# Patient Record
Sex: FEMALE | Race: WHITE | Employment: UNEMPLOYED | URBAN - METROPOLITAN AREA
[De-identification: names, ages, dates, MRNs, and addresses within clinical notes are randomized per-mention and may not be internally consistent; named-entity substitution may affect disease eponyms.]

---

## 2020-01-22 ENCOUNTER — OFFICE VISIT (OUTPATIENT)
Dept: FAMILY MEDICINE | Facility: CLINIC | Age: 20
End: 2020-01-22

## 2020-01-22 ENCOUNTER — APPOINTMENT (OUTPATIENT)
Dept: LAB | Facility: CLINIC | Age: 20
End: 2020-01-22

## 2020-01-22 VITALS
HEIGHT: 68 IN | OXYGEN SATURATION: 98 % | SYSTOLIC BLOOD PRESSURE: 125 MMHG | DIASTOLIC BLOOD PRESSURE: 81 MMHG | WEIGHT: 139 LBS | HEART RATE: 82 BPM | BODY MASS INDEX: 21.07 KG/M2

## 2020-01-22 DIAGNOSIS — K59.1 FUNCTIONAL DIARRHEA: ICD-10-CM

## 2020-01-22 DIAGNOSIS — K59.1 FUNCTIONAL DIARRHEA: Primary | ICD-10-CM

## 2020-01-22 LAB
C DIFF TOX B STL QL: NEGATIVE
SPECIMEN SOURCE: NORMAL

## 2020-01-22 RX ORDER — LEVONORGESTREL AND ETHINYL ESTRADIOL 0.15-0.03
1 KIT ORAL DAILY
Status: ON HOLD | COMMUNITY
Start: 2019-11-05 | End: 2020-12-08

## 2020-01-22 RX ORDER — LEVONORGESTREL AND ETHINYL ESTRADIOL 0.15-0.03
1 KIT ORAL DAILY
Status: ON HOLD | COMMUNITY
End: 2020-12-08

## 2020-01-22 ASSESSMENT — ENCOUNTER SYMPTOMS
ABDOMINAL PAIN: 0
CHILLS: 0
FEVER: 0
FATIGUE: 0
DIARRHEA: 1

## 2020-01-22 ASSESSMENT — PAIN SCALES - GENERAL: PAINLEVEL: NO PAIN (0)

## 2020-01-22 ASSESSMENT — MIFFLIN-ST. JEOR: SCORE: 1454

## 2020-01-22 NOTE — PROGRESS NOTES
"       HPI       Nimco Eaton is a 19 year old woman who presents with diarrhea. She is a healthy woman and is not currently taking any medications other than oral birth control. She was in [place] from 01/04-01/18/2020. She started having diarrhea on the 18th. She is vaccinated, she did not receive yellow fever or Typhoid vaccinations prior to travel. Today, her diarrhea is less than it has been. She wants to make sure she does not have a bacterial infection related to traveling out of the country.   Chief Complaint   Patient presents with     Diarrhea     Pt has diarrhea and nausea.      Problem, Medication and Allergy Lists were reviewed and updated if needed.    Patient is an established patient of this clinic.         Review of Systems:   Review of Systems     Constitutional:  Negative for fever, chills and fatigue.   Gastrointestinal:  Positive for diarrhea. Negative for abdominal pain.               Physical Exam:     Vitals:    01/22/20 0906   BP: 125/81   Pulse: 82   SpO2: 98%   Weight: 63 kg (139 lb)   Height: 1.727 m (5' 8\")     Body mass index is 21.13 kg/m .  Vitals were reviewed and were normal.     Physical Exam  Constitutional:       Appearance: Normal appearance.   HENT:      Head: Normocephalic.   Abdominal:      General: Abdomen is flat. There is no distension.      Palpations: Abdomen is soft.      Tenderness: There is no abdominal tenderness.   Musculoskeletal: Normal range of motion.   Skin:     General: Skin is warm and dry.   Neurological:      General: No focal deficit present.      Mental Status: She is alert and oriented to person, place, and time.   Psychiatric:         Mood and Affect: Mood normal.         Behavior: Behavior normal.         Results:     Stool studies pending.    Assessment and Plan     1. Functional diarrhea    There are no discontinued medications.  First, please obtain your stool samples. Next, please drink 70 oz of water daily. Next, please continue your " probiotic twice daily. Next, please call if you have any questions. Options for treatment and follow-up care were reviewed with the patient. Nimco Eaton  engaged in the decision making process and verbalized understanding of the options discussed and agreed with the final plan.  LEWIS Agosto, CNP  Addendum:I called and left a message stating that Nimco's stool test shows Norovirus. I asked her to review the info on CDC.gov and call if she has any questions. LEWIS Agosto, CNP  Addendum 01/23/2020 1028:Nimco asked that I call her back. I called her back and provided education on the Norovirus. She will hydrate and call if she has any questions or concerns. She verbalizes understanding of the plan.   LEWIS Agosto, CNP

## 2020-01-22 NOTE — NURSING NOTE
"19 year old  Chief Complaint   Patient presents with     Diarrhea     Pt has diarrhea and nausea.        Blood pressure 125/81, pulse 82, height 1.727 m (5' 8\"), weight 63 kg (139 lb), SpO2 98 %. Body mass index is 21.13 kg/m .  BP completed using cuff size:      Kyara Clark, A  January 22, 2020 9:10 AM  "

## 2020-01-22 NOTE — PATIENT INSTRUCTIONS
First, please obtain your stool samples. Next, please drink 70 oz of water daily. Next, please continue your probiotic twice daily. Next, please call if you have any questions.   [facility] Medication Refill Request Information:  * Please contact your pharmacy regarding ANY request for medication refills.  ** NP Clinic Prescription Fax = [phone]  * Please allow 3 business days for routine medication refills.  * Please allow 5 business days for controlled substance medication refills.     [facility] Test Result notification information:  *You will be notified with in 7-10 days of your appointment day regarding the results of your test.  If you are on MyChart you will be notified as soon as the provider has reviewed the results and signed off on them.    [facility]: [phone]

## 2020-01-23 ENCOUNTER — TELEPHONE (OUTPATIENT)
Dept: FAMILY MEDICINE | Facility: CLINIC | Age: 20
End: 2020-01-23

## 2020-01-23 LAB
C COLI+JEJUNI+LARI FUSA STL QL NAA+PROBE: NOT DETECTED
C PARVUM AG STL QL IA: NEGATIVE
EC STX1 GENE STL QL NAA+PROBE: NOT DETECTED
EC STX2 GENE STL QL NAA+PROBE: NOT DETECTED
ENTERIC PATHOGEN COMMENT: ABNORMAL
G LAMBLIA AG STL QL IA: NEGATIVE
NOROV GI+II ORF1-ORF2 JNC STL QL NAA+PR: ABNORMAL
O+P STL MICRO: NORMAL
RVA NSP5 STL QL NAA+PROBE: NOT DETECTED
SALMONELLA SP RPOD STL QL NAA+PROBE: NOT DETECTED
SHIGELLA SP+EIEC IPAH STL QL NAA+PROBE: NOT DETECTED
SPECIMEN SOURCE: NORMAL
SPECIMEN SOURCE: NORMAL
V CHOL+PARA RFBL+TRKH+TNAA STL QL NAA+PR: NOT DETECTED
Y ENTERO RECN STL QL NAA+PROBE: NOT DETECTED

## 2020-01-23 NOTE — TELEPHONE ENCOUNTER
[facility] Call Center    Phone Message    May a detailed message be left on voicemail: yes    Reason for Call: Other: PT states she is returning a call to Laeh Méndez regarding her test results.  Please follow up with the PT.      Action Taken: Message routed to:  [facility] ([facility] NP clinic

## 2020-02-03 ENCOUNTER — OFFICE VISIT (OUTPATIENT)
Dept: OPTOMETRY | Facility: CLINIC | Age: 20
End: 2020-02-03

## 2020-02-03 DIAGNOSIS — H52.533 SPASM OF ACCOMMODATION OF BOTH EYES: Primary | ICD-10-CM

## 2020-02-03 ASSESSMENT — CONF VISUAL FIELD
OS_NORMAL: 1
METHOD: COUNTING FINGERS
OD_NORMAL: 1

## 2020-02-03 ASSESSMENT — TONOMETRY
OS_IOP_MMHG: 19
IOP_METHOD: ICARE
OD_IOP_MMHG: 21

## 2020-02-03 ASSESSMENT — REFRACTION
OD_SPHERE: +0.25
OS_SPHERE: +0.25
OS_CYLINDER: SPHERE
OD_CYLINDER: SPHERE

## 2020-02-03 ASSESSMENT — VISUAL ACUITY
OD_SC: 20/20
OS_SC: J1+
METHOD: SNELLEN - LINEAR
OS_SC: 20/20
OD_SC: J1+

## 2020-02-03 ASSESSMENT — CUP TO DISC RATIO
OD_RATIO: 0.25
OS_RATIO: 0.25

## 2020-02-03 ASSESSMENT — EXTERNAL EXAM - RIGHT EYE: OD_EXAM: NORMAL

## 2020-02-03 ASSESSMENT — REFRACTION_MANIFEST
OD_CYLINDER: SPHERE
OD_SPHERE: +0.25
OS_SPHERE: +0.25
OS_CYLINDER: SPHERE

## 2020-02-03 ASSESSMENT — EXTERNAL EXAM - LEFT EYE: OS_EXAM: NORMAL

## 2020-02-03 ASSESSMENT — SLIT LAMP EXAM - LIDS
COMMENTS: NORMAL
COMMENTS: NORMAL

## 2020-11-09 ENCOUNTER — VIRTUAL VISIT (OUTPATIENT)
Dept: DERMATOLOGY | Facility: CLINIC | Age: 20
End: 2020-11-09
Payer: COMMERCIAL

## 2020-11-09 DIAGNOSIS — L70.0 ACNE VULGARIS: Primary | ICD-10-CM

## 2020-11-09 PROCEDURE — 99202 OFFICE O/P NEW SF 15 MIN: CPT | Mod: 95 | Performed by: PHYSICIAN ASSISTANT

## 2020-11-09 RX ORDER — TRETINOIN 0.25 MG/G
CREAM TOPICAL
Qty: 20 G | Refills: 1 | Status: ON HOLD | OUTPATIENT
Start: 2020-11-09 | End: 2020-12-08

## 2020-11-09 ASSESSMENT — PAIN SCALES - GENERAL: PAINLEVEL: NO PAIN (0)

## 2020-11-09 NOTE — PROGRESS NOTES
[facility] Dermatology Record:  Video: ( Invitation sent by:  [employer] waiting room )      Dermatology Problem List:  Acne vulgaris-  Gentle skin cares (Vanicream products)  Tretinoin 0.025% cream at bedtime    Eyelid dermatitis, likely irritant.  Gentle skin cares. Avoid chemical exposures     Encounter Date: Nov 9, 2020    CC:   Chief Complaint   Patient presents with     Acne     Nimco is wanting to discuss acne.      Derm Problem     Nimco would like to discuss a spot that is on her eyelid.        History of Present Illness:  Nimco Eaton is a 20 year old female who presents for acne concerns. I spoke with her over [employer] video chat. She states she starting having an acne breakout 2 weeks ago. It appeared more red at first, now it looks like white heads and clogged pores. It is mainly around her mouth. She has noticed this after starting a higher strength vitamin E moisturizer, from the [employer] product line. She uses a vitamin E oil to wash her face and witch hazel for a toner. She switched to a clean and clear morning burst for a face wash after this started. She has used this in the past and has tolerated it well.     She also wonders about a small rash on her right upper eyelid. She woke up one morning in the last month with a puffy eyelid and some dryness and redness. She applied a cool compress with improvement. She has been applying moisturizer to the area with improvement. It comes and goes. She sometimes forgets to remove her eye make up and has worsening symptoms that morning. She denies any rashes to other locations. No history of eczema.     ROS: Patient is generally feeling well today. Without other skin concerns.     Physical Examination:  General: Well-appearing 20 year old female, appropriately-developed individual.  Skin: Focused examination including the face was performed.   -No large pink papules to the face.   -No appreciable rash on the upper eyelid.     Labs:  None    Past Medical History:    There is no problem list on file for this patient.    No past medical history on file.  No past surgical history on file.    Social History:  Patient reports that she has never smoked. She has never used smokeless tobacco.  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    Family History:  Family History   Problem Relation Age of Onset     Glaucoma No family hx of      Macular Degeneration No family hx of    No family history of skin diseases or skin cancers.     Medications:  Current Outpatient Medications   Medication     CHATEAL EQ 0.15-30 MG-MCG tablet     levonorgestrel-ethinyl estradiol (SEASONALE) 0.15-0.03 MG tablet     OIL OF OREGANO PO     No current facility-administered medications for this visit.           No Known Allergies        Impression and Recommendations (Patient Counseled on the Following):  1. Acne vulgaris, more comedonal, due to products applied to face (acne cosmetica)  -Avoid Vitamin E moisturizer for now.   -Use gentle products (Vanicream line) of cleansers and moisturizers  -Start tretinoin 0.025% cream to face. Instructed to apply topical acne medication once every other day and increase to nightly as tolerate.  Waiting 20-30 minutes after washing affected area(s) will decrease irritation. Method of application, side effects and expected results were discussed. The patient will apply pea size amount to the entire face, avoid areas around the eyes, corners of nose and mouth. Discussed side effects including photosensitivity and irritation.     2. Faint Eyelid dermatitis, waxing and waning  -Avoid harsh chemicals on the hands (and face!)  -Use rubber gloves when cleaning.  -Avoid touching the face.   -Start washing off eye makeup daily. Avoid eye make up as much as possible. Dispose of any eye makeup 3-6 months old.  -Use Vaseline to remove eye make up.  -Pt defers rx at this time, but will consider if still persistent at f/u.         Follow-up:   Follow-up with  dermatology in approximately 3 months. Earlier for new or changing lesions or rash.      Staff only    All risk, benefits and alternatives were discussed with patient.  Patient is in agreement and understands the assessment and plan.  All questions were answered.  Sun Screen Education was given.   Return to Clinic in 3 months or sooner as needed.   Rosy Abdi PA-C   _____________________________________________________________________________    Teledermatology information:  - Location of patient: [place]  - Patient presented as: self referral  - Location of teledermatologist:  ([facility] [place] )  - Reason teledermatology is appropriate:  of National Emergency Regarding Coronavirus disease (COVID 19) Outbreak  - Image quality and interpretability: limited  - Physician has received verbal consent for a Video/Photos Visit from the patient? YES  - In-person dermatology visit recommendation: no  - Date of images: 11/9/20  - Service start time: 08:43 am  - Service end time: 09:01 am  - Date of report: 11/9/2020

## 2020-11-09 NOTE — NURSING NOTE
Dermatology Rooming Note    Nimco Eaton's goals for this visit include:   Chief Complaint   Patient presents with     Acne     Nimco is wanting to discuss acne.      Derm Problem     Nimco would like to discuss a spot that is on her eyelid.      JONEL Mehta

## 2020-11-09 NOTE — LETTER
11/9/2020       RE: Nimco Eaton  [id number] [facility]  [place] [address]     Dear Colleague,    Thank you for referring your patient, Nimco Eaton, to the [facility] DERMATOLOGY CLINIC [place] at [facility]. Please see a copy of my visit note below.    [facility] Dermatology Record:  Video: ( Invitation sent by:  [employer] waiting room )      Dermatology Problem List:  Acne vulgaris-  Gentle skin cares (Vanicream products)  Tretinoin 0.025% cream at bedtime    Eyelid dermatitis, likely irritant.  Gentle skin cares. Avoid chemical exposures     Encounter Date: Nov 9, 2020    CC:   Chief Complaint   Patient presents with     Acne     Nimco is wanting to discuss acne.      Derm Problem     Nimco would like to discuss a spot that is on her eyelid.        History of Present Illness:  Nimco Eaton is a 20 year old female who presents for acne concerns. I spoke with her over [employer] video chat. She states she starting having an acne breakout 2 weeks ago. It appeared more red at first, now it looks like white heads and clogged pores. It is mainly around her mouth. She has noticed this after starting a higher strength vitamin E moisturizer, from the [employer] product line. She uses a vitamin E oil to wash her face and witch hazel for a toner. She switched to a clean and clear morning burst for a face wash after this started. She has used this in the past and has tolerated it well.     She also wonders about a small rash on her right upper eyelid. She woke up one morning in the last month with a puffy eyelid and some dryness and redness. She applied a cool compress with improvement. She has been applying moisturizer to the area with improvement. It comes and goes. She sometimes forgets to remove her eye make up and has worsening symptoms that morning. She denies any rashes to other locations. No history of eczema.     ROS: Patient is generally feeling well today. Without  other skin concerns.     Physical Examination:  General: Well-appearing 20 year old female, appropriately-developed individual.  Skin: Focused examination including the face was performed.   -No large pink papules to the face.   -No appreciable rash on the upper eyelid.     Labs:  None    Past Medical History:   There is no problem list on file for this patient.    No past medical history on file.  No past surgical history on file.    Social History:  Patient reports that she has never smoked. She has never used smokeless tobacco.  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    Family History:  Family History   Problem Relation Age of Onset     Glaucoma No family hx of      Macular Degeneration No family hx of    No family history of skin diseases or skin cancers.     Medications:  Current Outpatient Medications   Medication     CHATEAL EQ 0.15-30 MG-MCG tablet     levonorgestrel-ethinyl estradiol (SEASONALE) 0.15-0.03 MG tablet     OIL OF OREGANO PO     No current facility-administered medications for this visit.           No Known Allergies        Impression and Recommendations (Patient Counseled on the Following):  1. Acne vulgaris, more comedonal, due to products applied to face (acne cosmetica)  -Avoid Vitamin E moisturizer for now.   -Use gentle products (Vanicream line) of cleansers and moisturizers  -Start tretinoin 0.025% cream to face. Instructed to apply topical acne medication once every other day and increase to nightly as tolerate.  Waiting 20-30 minutes after washing affected area(s) will decrease irritation. Method of application, side effects and expected results were discussed. The patient will apply pea size amount to the entire face, avoid areas around the eyes, corners of nose and mouth. Discussed side effects including photosensitivity and irritation.     2. Faint Eyelid dermatitis, waxing and waning  -Avoid harsh chemicals on the hands (and face!)  -Use rubber gloves  when cleaning.  -Avoid touching the face.   -Start washing off eye makeup daily. Avoid eye make up as much as possible. Dispose of any eye makeup 3-6 months old.  -Use Vaseline to remove eye make up.  -Pt defers rx at this time, but will consider if still persistent at f/u.         Follow-up:   Follow-up with dermatology in approximately 3 months. Earlier for new or changing lesions or rash.      Staff only    All risk, benefits and alternatives were discussed with patient.  Patient is in agreement and understands the assessment and plan.  All questions were answered.  Sun Screen Education was given.   Return to Clinic in 3 months or sooner as needed.   Rosy Abdi PA-C   _____________________________________________________________________________    Teledermatology information:  - Location of patient: [place]  - Patient presented as: self referral  - Location of teledermatologist:  ([facility] DER[facility] [place] )  - Reason teledermatology is appropriate:  of National Emergency Regarding Coronavirus disease (COVID 19) Outbreak  - Image quality and interpretability: limited  - Physician has received verbal consent for a Video/Photos Visit from the patient? YES  - In-person dermatology visit recommendation: no  - Date of images: 11/9/20  - Service start time: 08:43 am  - Service end time: 09:01 am  - Date of report: 11/9/2020

## 2020-11-09 NOTE — PATIENT INSTRUCTIONS
[facility] Dermatology Visit    Thank you for allowing us to participate in your care. Your findings, instructions and follow-up plan are as follows:    1. Acne vulgaris, more comedonal, due to products applied to face (acne cosmetica)  -Avoid Vitamin E moisturizer for now.   -Use gentle products (Vanicream line) of cleansers and moisturizers  -Start tretinoin 0.025% cream to face. Instructed to apply topical acne medication once every other day and increase to nightly as tolerate.  Waiting 20-30 minutes after washing affected area(s) will decrease irritation. Method of application, side effects and expected results were discussed. The patient will apply pea size amount to the entire face, avoid areas around the eyes, corners of nose and mouth. Discussed side effects including photosensitivity and irritation.      2. Faint Eyelid dermatitis, waxing and waning  -Avoid harsh chemicals on the hands (and face!)  -Use rubber gloves when cleaning.  -Avoid touching the face.   -Start washing off eye makeup daily. Avoid eye make up as much as possible. Dispose of any eye makeup 3-6 months old.  -Use Vaseline to remove eye make up.  -Pt defers rx at this time, but will consider if still persistent at f/u.     When should I call my doctor?    If you are worsening or not improving, please, contact us or seek urgent care as noted below.     Who should I call with questions (adults)?    [facility] (adult and pediatric): [phone]     [facility] (adult): [phone]    For urgent needs outside of business hours call the [facility] at [phone] and ask for the dermatology resident on call    If this is a medical emergency and you are unable to reach an ER, Call [phone]      Who should I call with questions (pediatric)?  [facility]- Pediatric Dermatology  Dr. Shantal Diaz, Dr. Lauren Persaud, Dr. Mahnaz Chavez,  [address] Dr. Alaina Henson & Dr. Addy Johnson  Non Urgent  Nurse Triage Line; [phone]- Mayte and Marcy CHAIDEZ Care Coordinators   Danielle (/Complex ) [phone]    If you need a prescription refill, please contact your pharmacy. Refills are approved or denied by our Physicians during normal business hours, Monday through Fridays  Per office policy, refills will not be granted if you have not been seen within the past year (or sooner depending on your child's condition)    Scheduling Information:  Pediatric Appointment Scheduling and Call Center [phone]  Radiology Scheduling- [phone]  Sedation Unit Scheduling- [phone]  [place] Scheduling- [facility] [phone]; Pediatric Dermatology [phone]  Main  Services: [phone]  Citizen of the [place]: [phone]  Russian: [phone]  Hmong/German/Frank: [phone]  Preadmission Nursing Department Fax Number: [phone] (Fax all pre-operative paperwork to this number)    For urgent matters arising during evenings, weekends, or holidays that cannot wait for normal business hours please call [phone] and ask for the Dermatology Resident On-Call to be paged.          Gentle Skin Care    Below is a list of products our providers recommend for gentle skin care.  Moisturizers:    Lighter; Exederm Intensive Moisture Cream, Cetaphil Cream, CeraVe, Aveeno Positively radiant and Vanicream Light     Thicker; Aquaphor Ointment, Vaseline, Petroleum Jelly, Eucerin Original Healing Cream and Vanicream, CeraVe Healing Ointment, Aquaphor Body Spray    Avoid Lotions (too thin)  Mild Cleansers:    Dove- Fragrance Free bar or wash    CeraVe     Vanicream Cleansing bar    Cetaphil Cleanser     Aquaphor 2 in1 Gentle Wash and Shampoo    Dove Baby wash    Exederm Body wash       Laundry Products:      All Free and Clear    Cheer Free    Generic Brands are okay as long as they are  Fragrance Free       Avoid fabric softeners  and dryer sheets   Sunscreens: SPF 30 or greater       Sunscreens that contain Zinc Oxide and/or Titanium Dioxide should be applied, these are physical blockers. One or both of these should be listed in the  Active Ingredients     Any other listed ingredients under the active ingredients would be a chemically based sunscreen which might be irritating.    Spray sunscreens should be avoided because these are typically chemical sunscreens.      Shampoo and Conditioners:    Free and Clear by Vanicream    Aquaphor 2 in 1 Gentle Wash and Shampoo   Oils:    Mineral Oil     Emu Oil     For some patients: Coconut (raw, unrefined, organic) and Sunflower seed oil              Generic Products are an okay substitute, but make sure they are fragrance free.  *Reading the product ingredients list is very important  *Avoid product that have fragrance added to them.   *Organic does not mean  fragrance free.  In fact patients with sensitive skin can become quite irritated by some organic products.     1. Daily bathing is recommended. Make sure you are applying a good moisturizer after bathing every time.  2. Use Moisturizing creams at least twice daily to the whole body. Your provider may recommend a lighter or heavier moisturizer based on your child s severity and that time of year it is.  3. Creams are more moisturizing than lotions.       Care Plan:  1. Keep bathing and showering short, less than 15 minutes   2. Always use lukewarm warm when possible. AVOID HOT or COLD water  3. DO NOT use bubble bath  4. Limit the use of soaps. Focus on the skin folds, face, armpits, groin and feet towards the end of the bath  5. Do NOT vigorously scrub when you cleanse the skin  6. After bathing, PAT your skin lightly with a towel. DO NOT rub or scrub when drying  7. ALWAYS apply a moisturizer immediately after bathing. This helps to  lock in  the moisture. * IF YOU WERE PRESCRIBED A TOPICAL MEDICATION, APPLY YOUR  MEDICATION FIRST THEN COVER WITH YOUR DAILY MOISTURIZER  8. Reapply moisturizing agents at least twice daily to your whole body    Other helpful tips:    Do not use products such as powders, perfumes, or colognes on your skin    Diffusers can be harsh on sensitive skin, use with caution if you or your child has sensitive skin     Avoid saunas and steam baths. This temperature is too HOT    Avoid tight or  scratchy  clothing such as wool    Always wash new clothing before wearing them for the first time    Sometimes a humidifier or vaporizer can be used at night can help the dry skin. Remember to keep these items clean to avoid mold growth.

## 2020-12-07 ENCOUNTER — APPOINTMENT (OUTPATIENT)
Dept: GENERAL RADIOLOGY | Facility: CLINIC | Age: 20
DRG: 982 | End: 2020-12-07
Attending: EMERGENCY MEDICINE
Payer: COMMERCIAL

## 2020-12-07 ENCOUNTER — APPOINTMENT (OUTPATIENT)
Dept: CT IMAGING | Facility: CLINIC | Age: 20
DRG: 982 | End: 2020-12-07
Attending: EMERGENCY MEDICINE
Payer: COMMERCIAL

## 2020-12-07 ENCOUNTER — APPOINTMENT (OUTPATIENT)
Dept: GENERAL RADIOLOGY | Facility: CLINIC | Age: 20
DRG: 982 | End: 2020-12-07
Attending: SURGERY
Payer: COMMERCIAL

## 2020-12-07 ENCOUNTER — HOSPITAL ENCOUNTER (INPATIENT)
Facility: CLINIC | Age: 20
LOS: 2 days | Discharge: ANOTHER HOSPITAL | DRG: 982 | End: 2020-12-09
Attending: EMERGENCY MEDICINE | Admitting: SURGERY
Payer: COMMERCIAL

## 2020-12-07 ENCOUNTER — APPOINTMENT (OUTPATIENT)
Dept: INTERVENTIONAL RADIOLOGY/VASCULAR | Facility: CLINIC | Age: 20
DRG: 982 | End: 2020-12-07
Attending: NURSE PRACTITIONER
Payer: COMMERCIAL

## 2020-12-07 DIAGNOSIS — S32.511A CLOSED FRACTURE OF SUPERIOR RAMUS OF RIGHT PUBIS, INITIAL ENCOUNTER (H): ICD-10-CM

## 2020-12-07 DIAGNOSIS — T07.XXXA MULTIPLE TRAUMA: ICD-10-CM

## 2020-12-07 DIAGNOSIS — S06.0X9A CONCUSSION WITH MODERATE (1-24 HOURS) LOSS OF CONSCIOUSNESS: ICD-10-CM

## 2020-12-07 DIAGNOSIS — S22.000A COMPRESSION FRACTURE OF THORACIC VERTEBRA, CLOSED, INITIAL ENCOUNTER (H): Primary | ICD-10-CM

## 2020-12-07 DIAGNOSIS — S32.010A COMPRESSION FRACTURE OF L1 LUMBAR VERTEBRA, CLOSED, INITIAL ENCOUNTER (H): ICD-10-CM

## 2020-12-07 LAB
ABO + RH BLD: NORMAL
ABO + RH BLD: NORMAL
ALBUMIN SERPL-MCNC: 4.1 G/DL (ref 3.4–5)
ALP SERPL-CCNC: 72 U/L (ref 40–150)
ALT SERPL W P-5'-P-CCNC: 48 U/L (ref 0–50)
ANION GAP SERPL CALCULATED.3IONS-SCNC: 11 MMOL/L (ref 3–14)
AST SERPL W P-5'-P-CCNC: ABNORMAL U/L (ref 0–45)
B-HCG SERPL-ACNC: <1 IU/L (ref 0–5)
BASOPHILS # BLD AUTO: 0.1 10E9/L (ref 0–0.2)
BASOPHILS NFR BLD AUTO: 0.3 %
BILIRUB SERPL-MCNC: 1.3 MG/DL (ref 0.2–1.3)
BLD GP AB SCN SERPL QL: NORMAL
BLOOD BANK CMNT PATIENT-IMP: NORMAL
BUN SERPL-MCNC: 8 MG/DL (ref 7–30)
CALCIUM SERPL-MCNC: 9.4 MG/DL (ref 8.5–10.1)
CHLORIDE SERPL-SCNC: 106 MMOL/L (ref 94–109)
CO2 SERPL-SCNC: 20 MMOL/L (ref 20–32)
CREAT SERPL-MCNC: 0.92 MG/DL (ref 0.52–1.04)
DIFFERENTIAL METHOD BLD: ABNORMAL
EOSINOPHIL # BLD AUTO: 0 10E9/L (ref 0–0.7)
EOSINOPHIL NFR BLD AUTO: 0.2 %
ERYTHROCYTE [DISTWIDTH] IN BLOOD BY AUTOMATED COUNT: 12 % (ref 10–15)
ERYTHROCYTE [DISTWIDTH] IN BLOOD BY AUTOMATED COUNT: 12 % (ref 10–15)
ETHANOL SERPL-MCNC: <0.01 G/DL
GFR SERPL CREATININE-BSD FRML MDRD: 89 ML/MIN/{1.73_M2}
GLUCOSE SERPL-MCNC: 120 MG/DL (ref 70–99)
HCG SERPL QL: NEGATIVE
HCT VFR BLD AUTO: 29.2 % (ref 35–47)
HCT VFR BLD AUTO: 38.8 % (ref 35–47)
HGB BLD-MCNC: 11.2 G/DL (ref 11.7–15.7)
HGB BLD-MCNC: 13 G/DL (ref 11.7–15.7)
HGB BLD-MCNC: 9.4 G/DL (ref 11.7–15.7)
HGB BLD-MCNC: 9.7 G/DL (ref 11.7–15.7)
IMM GRANULOCYTES # BLD: 0.5 10E9/L (ref 0–0.4)
IMM GRANULOCYTES NFR BLD: 3.6 %
INR PPP: 1.26 (ref 0.86–1.14)
LYMPHOCYTES # BLD AUTO: 2.3 10E9/L (ref 0.8–5.3)
LYMPHOCYTES NFR BLD AUTO: 15.6 %
MAGNESIUM SERPL-MCNC: 1.5 MG/DL (ref 1.6–2.3)
MCH RBC QN AUTO: 30.4 PG (ref 26.5–33)
MCH RBC QN AUTO: 30.6 PG (ref 26.5–33)
MCHC RBC AUTO-ENTMCNC: 33.2 G/DL (ref 31.5–36.5)
MCHC RBC AUTO-ENTMCNC: 33.5 G/DL (ref 31.5–36.5)
MCV RBC AUTO: 91 FL (ref 78–100)
MCV RBC AUTO: 92 FL (ref 78–100)
MONOCYTES # BLD AUTO: 0.7 10E9/L (ref 0–1.3)
MONOCYTES NFR BLD AUTO: 4.8 %
NEUTROPHILS # BLD AUTO: 11 10E9/L (ref 1.6–8.3)
NEUTROPHILS NFR BLD AUTO: 75.5 %
NRBC # BLD AUTO: 0 10*3/UL
NRBC BLD AUTO-RTO: 0 /100
PLATELET # BLD AUTO: 188 10E9/L (ref 150–450)
PLATELET # BLD AUTO: 292 10E9/L (ref 150–450)
POTASSIUM SERPL-SCNC: 3.8 MMOL/L (ref 3.4–5.3)
POTASSIUM SERPL-SCNC: 4.1 MMOL/L (ref 3.4–5.3)
PROT SERPL-MCNC: 7.4 G/DL (ref 6.8–8.8)
RADIOLOGIST FLAGS: ABNORMAL
RBC # BLD AUTO: 3.19 10E12/L (ref 3.8–5.2)
RBC # BLD AUTO: 4.25 10E12/L (ref 3.8–5.2)
SODIUM SERPL-SCNC: 137 MMOL/L (ref 133–144)
SPECIMEN EXP DATE BLD: NORMAL
WBC # BLD AUTO: 14.6 10E9/L (ref 4–11)
WBC # BLD AUTO: 9.3 10E9/L (ref 4–11)

## 2020-12-07 PROCEDURE — 82040 ASSAY OF SERUM ALBUMIN: CPT

## 2020-12-07 PROCEDURE — 72132 CT LUMBAR SPINE W/DYE: CPT | Mod: 26 | Performed by: RADIOLOGY

## 2020-12-07 PROCEDURE — 683N000002 HC PARTIAL TRAUMA W/O CC LEVEL III

## 2020-12-07 PROCEDURE — 36415 COLL VENOUS BLD VENIPUNCTURE: CPT | Performed by: SURGERY

## 2020-12-07 PROCEDURE — 272N000116 HC CATH CR1

## 2020-12-07 PROCEDURE — C1769 GUIDE WIRE: HCPCS

## 2020-12-07 PROCEDURE — C1887 CATHETER, GUIDING: HCPCS

## 2020-12-07 PROCEDURE — 76937 US GUIDE VASCULAR ACCESS: CPT | Mod: 26 | Performed by: RADIOLOGY

## 2020-12-07 PROCEDURE — 272N000566 HC SHEATH CR3

## 2020-12-07 PROCEDURE — 272N000504 HC NEEDLE CR4

## 2020-12-07 PROCEDURE — 99222 1ST HOSP IP/OBS MODERATE 55: CPT | Performed by: PHYSICIAN ASSISTANT

## 2020-12-07 PROCEDURE — 272N000679 HC CATH NEURO CR25

## 2020-12-07 PROCEDURE — 99221 1ST HOSP IP/OBS SF/LOW 40: CPT | Mod: GC | Performed by: NEUROLOGICAL SURGERY

## 2020-12-07 PROCEDURE — 72132 CT LUMBAR SPINE W/DYE: CPT

## 2020-12-07 PROCEDURE — 86850 RBC ANTIBODY SCREEN: CPT | Performed by: EMERGENCY MEDICINE

## 2020-12-07 PROCEDURE — 85027 COMPLETE CBC AUTOMATED: CPT | Performed by: SURGERY

## 2020-12-07 PROCEDURE — 36248 INS CATH ABD/L-EXT ART ADDL: CPT | Mod: GC | Performed by: RADIOLOGY

## 2020-12-07 PROCEDURE — 258N000003 HC RX IP 258 OP 636: Performed by: EMERGENCY MEDICINE

## 2020-12-07 PROCEDURE — 70450 CT HEAD/BRAIN W/O DYE: CPT

## 2020-12-07 PROCEDURE — 84703 CHORIONIC GONADOTROPIN ASSAY: CPT | Performed by: EMERGENCY MEDICINE

## 2020-12-07 PROCEDURE — 99152 MOD SED SAME PHYS/QHP 5/>YRS: CPT

## 2020-12-07 PROCEDURE — 72125 CT NECK SPINE W/O DYE: CPT

## 2020-12-07 PROCEDURE — 250N000011 HC RX IP 250 OP 636: Performed by: EMERGENCY MEDICINE

## 2020-12-07 PROCEDURE — 99285 EMERGENCY DEPT VISIT HI MDM: CPT | Mod: 25

## 2020-12-07 PROCEDURE — 86901 BLOOD TYPING SEROLOGIC RH(D): CPT | Performed by: EMERGENCY MEDICINE

## 2020-12-07 PROCEDURE — 258N000003 HC RX IP 258 OP 636: Performed by: STUDENT IN AN ORGANIZED HEALTH CARE EDUCATION/TRAINING PROGRAM

## 2020-12-07 PROCEDURE — 72190 X-RAY EXAM OF PELVIS: CPT

## 2020-12-07 PROCEDURE — 74177 CT ABD & PELVIS W/CONTRAST: CPT | Mod: 26 | Performed by: STUDENT IN AN ORGANIZED HEALTH CARE EDUCATION/TRAINING PROGRAM

## 2020-12-07 PROCEDURE — 250N000011 HC RX IP 250 OP 636: Performed by: STUDENT IN AN ORGANIZED HEALTH CARE EDUCATION/TRAINING PROGRAM

## 2020-12-07 PROCEDURE — 272N000143 HC KIT CR3

## 2020-12-07 PROCEDURE — 71260 CT THORAX DX C+: CPT | Mod: 26 | Performed by: STUDENT IN AN ORGANIZED HEALTH CARE EDUCATION/TRAINING PROGRAM

## 2020-12-07 PROCEDURE — 84155 ASSAY OF PROTEIN SERUM: CPT

## 2020-12-07 PROCEDURE — 96376 TX/PRO/DX INJ SAME DRUG ADON: CPT

## 2020-12-07 PROCEDURE — 80320 DRUG SCREEN QUANTALCOHOLS: CPT | Performed by: EMERGENCY MEDICINE

## 2020-12-07 PROCEDURE — 75736 ARTERY X-RAYS PELVIS: CPT

## 2020-12-07 PROCEDURE — 70450 CT HEAD/BRAIN W/O DYE: CPT | Mod: 26 | Performed by: RADIOLOGY

## 2020-12-07 PROCEDURE — 250N000013 HC RX MED GY IP 250 OP 250 PS 637: Performed by: PHYSICIAN ASSISTANT

## 2020-12-07 PROCEDURE — 99291 CRITICAL CARE FIRST HOUR: CPT | Performed by: EMERGENCY MEDICINE

## 2020-12-07 PROCEDURE — 250N000011 HC RX IP 250 OP 636: Performed by: RADIOLOGY

## 2020-12-07 PROCEDURE — 82247 BILIRUBIN TOTAL: CPT

## 2020-12-07 PROCEDURE — 86900 BLOOD TYPING SEROLOGIC ABO: CPT | Performed by: EMERGENCY MEDICINE

## 2020-12-07 PROCEDURE — 84460 ALANINE AMINO (ALT) (SGPT): CPT

## 2020-12-07 PROCEDURE — 96375 TX/PRO/DX INJ NEW DRUG ADDON: CPT

## 2020-12-07 PROCEDURE — 85610 PROTHROMBIN TIME: CPT | Performed by: EMERGENCY MEDICINE

## 2020-12-07 PROCEDURE — 71045 X-RAY EXAM CHEST 1 VIEW: CPT | Mod: 26 | Performed by: RADIOLOGY

## 2020-12-07 PROCEDURE — 71045 X-RAY EXAM CHEST 1 VIEW: CPT

## 2020-12-07 PROCEDURE — 75774 ARTERY X-RAY EACH VESSEL: CPT

## 2020-12-07 PROCEDURE — 120N000003 HC R&B IMCU UMMC

## 2020-12-07 PROCEDURE — 85018 HEMOGLOBIN: CPT | Performed by: EMERGENCY MEDICINE

## 2020-12-07 PROCEDURE — 37244 VASC EMBOLIZE/OCCLUDE BLEED: CPT

## 2020-12-07 PROCEDURE — 84702 CHORIONIC GONADOTROPIN TEST: CPT | Performed by: EMERGENCY MEDICINE

## 2020-12-07 PROCEDURE — 72170 X-RAY EXAM OF PELVIS: CPT | Mod: 26 | Performed by: RADIOLOGY

## 2020-12-07 PROCEDURE — 258N000003 HC RX IP 258 OP 636: Performed by: RADIOLOGY

## 2020-12-07 PROCEDURE — 72129 CT CHEST SPINE W/DYE: CPT | Mod: 26 | Performed by: RADIOLOGY

## 2020-12-07 PROCEDURE — 85025 COMPLETE CBC W/AUTO DIFF WBC: CPT | Performed by: EMERGENCY MEDICINE

## 2020-12-07 PROCEDURE — 99152 MOD SED SAME PHYS/QHP 5/>YRS: CPT | Mod: GC | Performed by: RADIOLOGY

## 2020-12-07 PROCEDURE — 36415 COLL VENOUS BLD VENIPUNCTURE: CPT | Performed by: PHYSICIAN ASSISTANT

## 2020-12-07 PROCEDURE — 96374 THER/PROPH/DIAG INJ IV PUSH: CPT

## 2020-12-07 PROCEDURE — 85018 HEMOGLOBIN: CPT | Performed by: RADIOLOGY

## 2020-12-07 PROCEDURE — 71260 CT THORAX DX C+: CPT

## 2020-12-07 PROCEDURE — 36247 INS CATH ABD/L-EXT ART 3RD: CPT

## 2020-12-07 PROCEDURE — 99153 MOD SED SAME PHYS/QHP EA: CPT

## 2020-12-07 PROCEDURE — 83735 ASSAY OF MAGNESIUM: CPT | Performed by: PHYSICIAN ASSISTANT

## 2020-12-07 PROCEDURE — 96361 HYDRATE IV INFUSION ADD-ON: CPT

## 2020-12-07 PROCEDURE — 72170 X-RAY EXAM OF PELVIS: CPT

## 2020-12-07 PROCEDURE — 37244 VASC EMBOLIZE/OCCLUDE BLEED: CPT | Mod: GC | Performed by: RADIOLOGY

## 2020-12-07 PROCEDURE — 36248 INS CATH ABD/L-EXT ART ADDL: CPT

## 2020-12-07 PROCEDURE — 72190 X-RAY EXAM OF PELVIS: CPT | Mod: 26 | Performed by: RADIOLOGY

## 2020-12-07 PROCEDURE — 72125 CT NECK SPINE W/O DYE: CPT | Mod: 26 | Performed by: RADIOLOGY

## 2020-12-07 PROCEDURE — 36247 INS CATH ABD/L-EXT ART 3RD: CPT | Mod: 51 | Performed by: RADIOLOGY

## 2020-12-07 PROCEDURE — 72129 CT CHEST SPINE W/DYE: CPT

## 2020-12-07 PROCEDURE — 255N000002 HC RX 255 OP 636: Performed by: RADIOLOGY

## 2020-12-07 PROCEDURE — 80048 BASIC METABOLIC PNL TOTAL CA: CPT

## 2020-12-07 PROCEDURE — 84132 ASSAY OF SERUM POTASSIUM: CPT | Performed by: PHYSICIAN ASSISTANT

## 2020-12-07 PROCEDURE — 272N000652 HC COIL/EMBOLIC DEVICE CR7

## 2020-12-07 PROCEDURE — 84075 ASSAY ALKALINE PHOSPHATASE: CPT

## 2020-12-07 RX ORDER — NALOXONE HYDROCHLORIDE 0.4 MG/ML
0.2 INJECTION, SOLUTION INTRAMUSCULAR; INTRAVENOUS; SUBCUTANEOUS
Status: DISCONTINUED | OUTPATIENT
Start: 2020-12-07 | End: 2020-12-07 | Stop reason: HOSPADM

## 2020-12-07 RX ORDER — NALOXONE HYDROCHLORIDE 0.4 MG/ML
0.2 INJECTION, SOLUTION INTRAMUSCULAR; INTRAVENOUS; SUBCUTANEOUS
Status: DISCONTINUED | OUTPATIENT
Start: 2020-12-07 | End: 2020-12-09 | Stop reason: HOSPADM

## 2020-12-07 RX ORDER — LIDOCAINE 4 G/G
1-3 PATCH TOPICAL
Status: DISCONTINUED | OUTPATIENT
Start: 2020-12-07 | End: 2020-12-09 | Stop reason: HOSPADM

## 2020-12-07 RX ORDER — SODIUM CHLORIDE 9 MG/ML
INJECTION, SOLUTION INTRAVENOUS CONTINUOUS
Status: DISCONTINUED | OUTPATIENT
Start: 2020-12-07 | End: 2020-12-07

## 2020-12-07 RX ORDER — SODIUM CHLORIDE, SODIUM LACTATE, POTASSIUM CHLORIDE, CALCIUM CHLORIDE 600; 310; 30; 20 MG/100ML; MG/100ML; MG/100ML; MG/100ML
1000 INJECTION, SOLUTION INTRAVENOUS CONTINUOUS
Status: DISCONTINUED | OUTPATIENT
Start: 2020-12-07 | End: 2020-12-07

## 2020-12-07 RX ORDER — NALOXONE HYDROCHLORIDE 0.4 MG/ML
0.4 INJECTION, SOLUTION INTRAMUSCULAR; INTRAVENOUS; SUBCUTANEOUS
Status: DISCONTINUED | OUTPATIENT
Start: 2020-12-07 | End: 2020-12-07 | Stop reason: HOSPADM

## 2020-12-07 RX ORDER — NALOXONE HYDROCHLORIDE 0.4 MG/ML
0.4 INJECTION, SOLUTION INTRAMUSCULAR; INTRAVENOUS; SUBCUTANEOUS
Status: DISCONTINUED | OUTPATIENT
Start: 2020-12-07 | End: 2020-12-09 | Stop reason: HOSPADM

## 2020-12-07 RX ORDER — CYCLOBENZAPRINE HCL 5 MG
5 TABLET ORAL 3 TIMES DAILY PRN
Status: DISCONTINUED | OUTPATIENT
Start: 2020-12-07 | End: 2020-12-09 | Stop reason: HOSPADM

## 2020-12-07 RX ORDER — SODIUM CHLORIDE 9 MG/ML
INJECTION, SOLUTION INTRAVENOUS CONTINUOUS
Status: DISCONTINUED | OUTPATIENT
Start: 2020-12-07 | End: 2020-12-08

## 2020-12-07 RX ORDER — DOCUSATE SODIUM 100 MG/1
100 CAPSULE, LIQUID FILLED ORAL 2 TIMES DAILY
Status: DISCONTINUED | OUTPATIENT
Start: 2020-12-07 | End: 2020-12-09 | Stop reason: HOSPADM

## 2020-12-07 RX ORDER — LORAZEPAM 2 MG/ML
0.5 INJECTION INTRAMUSCULAR ONCE
Status: COMPLETED | OUTPATIENT
Start: 2020-12-07 | End: 2020-12-07

## 2020-12-07 RX ORDER — NITROGLYCERIN 5 MG/ML
100-500 VIAL (ML) INTRAVENOUS
Status: COMPLETED | OUTPATIENT
Start: 2020-12-07 | End: 2020-12-07

## 2020-12-07 RX ORDER — NICOTINE POLACRILEX 4 MG
15-30 LOZENGE BUCCAL
Status: DISCONTINUED | OUTPATIENT
Start: 2020-12-07 | End: 2020-12-09 | Stop reason: HOSPADM

## 2020-12-07 RX ORDER — HEPARIN SODIUM 200 [USP'U]/100ML
1 INJECTION, SOLUTION INTRAVENOUS CONTINUOUS PRN
Status: DISCONTINUED | OUTPATIENT
Start: 2020-12-07 | End: 2020-12-07 | Stop reason: HOSPADM

## 2020-12-07 RX ORDER — FENTANYL CITRATE 50 UG/ML
25-50 INJECTION, SOLUTION INTRAMUSCULAR; INTRAVENOUS EVERY 5 MIN PRN
Status: DISCONTINUED | OUTPATIENT
Start: 2020-12-07 | End: 2020-12-07 | Stop reason: HOSPADM

## 2020-12-07 RX ORDER — PROCHLORPERAZINE MALEATE 10 MG
10 TABLET ORAL EVERY 6 HOURS PRN
Status: DISCONTINUED | OUTPATIENT
Start: 2020-12-07 | End: 2020-12-09 | Stop reason: HOSPADM

## 2020-12-07 RX ORDER — IODIXANOL 320 MG/ML
150 INJECTION, SOLUTION INTRAVASCULAR ONCE
Status: COMPLETED | OUTPATIENT
Start: 2020-12-07 | End: 2020-12-07

## 2020-12-07 RX ORDER — ONDANSETRON 4 MG/1
4 TABLET, ORALLY DISINTEGRATING ORAL EVERY 6 HOURS PRN
Status: DISCONTINUED | OUTPATIENT
Start: 2020-12-07 | End: 2020-12-09 | Stop reason: HOSPADM

## 2020-12-07 RX ORDER — IOPAMIDOL 755 MG/ML
100 INJECTION, SOLUTION INTRAVASCULAR ONCE
Status: COMPLETED | OUTPATIENT
Start: 2020-12-07 | End: 2020-12-07

## 2020-12-07 RX ORDER — DEXTROSE MONOHYDRATE 25 G/50ML
25-50 INJECTION, SOLUTION INTRAVENOUS
Status: DISCONTINUED | OUTPATIENT
Start: 2020-12-07 | End: 2020-12-09 | Stop reason: HOSPADM

## 2020-12-07 RX ORDER — ONDANSETRON 2 MG/ML
4 INJECTION INTRAMUSCULAR; INTRAVENOUS
Status: COMPLETED | OUTPATIENT
Start: 2020-12-07 | End: 2020-12-07

## 2020-12-07 RX ORDER — FLUMAZENIL 0.1 MG/ML
0.2 INJECTION, SOLUTION INTRAVENOUS
Status: DISCONTINUED | OUTPATIENT
Start: 2020-12-07 | End: 2020-12-07 | Stop reason: HOSPADM

## 2020-12-07 RX ORDER — HYDROMORPHONE HYDROCHLORIDE 1 MG/ML
.3-.5 INJECTION, SOLUTION INTRAMUSCULAR; INTRAVENOUS; SUBCUTANEOUS
Status: DISCONTINUED | OUTPATIENT
Start: 2020-12-07 | End: 2020-12-09 | Stop reason: HOSPADM

## 2020-12-07 RX ORDER — HYDROMORPHONE HYDROCHLORIDE 1 MG/ML
0.5 INJECTION, SOLUTION INTRAMUSCULAR; INTRAVENOUS; SUBCUTANEOUS
Status: DISCONTINUED | OUTPATIENT
Start: 2020-12-07 | End: 2020-12-09 | Stop reason: HOSPADM

## 2020-12-07 RX ORDER — OXYCODONE HYDROCHLORIDE 5 MG/1
5-10 TABLET ORAL
Status: DISCONTINUED | OUTPATIENT
Start: 2020-12-07 | End: 2020-12-09 | Stop reason: HOSPADM

## 2020-12-07 RX ORDER — HYDROMORPHONE HYDROCHLORIDE 1 MG/ML
0.5 INJECTION, SOLUTION INTRAMUSCULAR; INTRAVENOUS; SUBCUTANEOUS ONCE
Status: COMPLETED | OUTPATIENT
Start: 2020-12-07 | End: 2020-12-07

## 2020-12-07 RX ORDER — ONDANSETRON 2 MG/ML
4 INJECTION INTRAMUSCULAR; INTRAVENOUS EVERY 6 HOURS PRN
Status: DISCONTINUED | OUTPATIENT
Start: 2020-12-07 | End: 2020-12-09 | Stop reason: HOSPADM

## 2020-12-07 RX ORDER — PROCHLORPERAZINE 25 MG
25 SUPPOSITORY, RECTAL RECTAL EVERY 12 HOURS PRN
Status: DISCONTINUED | OUTPATIENT
Start: 2020-12-07 | End: 2020-12-09 | Stop reason: HOSPADM

## 2020-12-07 RX ORDER — ACETAMINOPHEN 325 MG/1
975 TABLET ORAL 3 TIMES DAILY
Status: DISCONTINUED | OUTPATIENT
Start: 2020-12-07 | End: 2020-12-09 | Stop reason: HOSPADM

## 2020-12-07 RX ADMIN — HYDROMORPHONE HYDROCHLORIDE 0.5 MG: 1 INJECTION, SOLUTION INTRAMUSCULAR; INTRAVENOUS; SUBCUTANEOUS at 12:37

## 2020-12-07 RX ADMIN — SODIUM CHLORIDE 1000 ML: 9 INJECTION, SOLUTION INTRAVENOUS at 12:37

## 2020-12-07 RX ADMIN — MIDAZOLAM 1.5 MG: 1 INJECTION INTRAMUSCULAR; INTRAVENOUS at 17:42

## 2020-12-07 RX ADMIN — FENTANYL CITRATE 25 MCG: 50 INJECTION, SOLUTION INTRAMUSCULAR; INTRAVENOUS at 18:18

## 2020-12-07 RX ADMIN — MIDAZOLAM 0.5 MG: 1 INJECTION INTRAMUSCULAR; INTRAVENOUS at 18:50

## 2020-12-07 RX ADMIN — FENTANYL CITRATE 75 MCG: 50 INJECTION, SOLUTION INTRAMUSCULAR; INTRAVENOUS at 17:42

## 2020-12-07 RX ADMIN — SODIUM CHLORIDE: 9 INJECTION, SOLUTION INTRAVENOUS at 21:03

## 2020-12-07 RX ADMIN — ONDANSETRON 4 MG: 2 INJECTION INTRAMUSCULAR; INTRAVENOUS at 12:37

## 2020-12-07 RX ADMIN — MIDAZOLAM 0.5 MG: 1 INJECTION INTRAMUSCULAR; INTRAVENOUS at 18:18

## 2020-12-07 RX ADMIN — NITROGLYCERIN 250 MCG: 20 INJECTION INTRAVENOUS at 17:42

## 2020-12-07 RX ADMIN — LORAZEPAM 0.5 MG: 2 INJECTION INTRAMUSCULAR; INTRAVENOUS at 13:06

## 2020-12-07 RX ADMIN — MIDAZOLAM 0.5 MG: 1 INJECTION INTRAMUSCULAR; INTRAVENOUS at 17:57

## 2020-12-07 RX ADMIN — HYDROMORPHONE HYDROCHLORIDE 0.5 MG: 1 INJECTION, SOLUTION INTRAMUSCULAR; INTRAVENOUS; SUBCUTANEOUS at 15:02

## 2020-12-07 RX ADMIN — IODIXANOL 300 ML: 320 INJECTION, SOLUTION INTRAVASCULAR at 19:05

## 2020-12-07 RX ADMIN — FENTANYL CITRATE 25 MCG: 50 INJECTION, SOLUTION INTRAMUSCULAR; INTRAVENOUS at 18:50

## 2020-12-07 RX ADMIN — FENTANYL CITRATE 25 MCG: 50 INJECTION, SOLUTION INTRAMUSCULAR; INTRAVENOUS at 17:57

## 2020-12-07 RX ADMIN — HEPARIN SODIUM 2 BAG: 200 INJECTION, SOLUTION INTRAVENOUS at 17:30

## 2020-12-07 RX ADMIN — ACETAMINOPHEN 975 MG: 325 TABLET, FILM COATED ORAL at 21:00

## 2020-12-07 RX ADMIN — IOPAMIDOL 100 ML: 755 INJECTION, SOLUTION INTRAVENOUS at 13:55

## 2020-12-07 ASSESSMENT — ENCOUNTER SYMPTOMS
VOMITING: 0
NECK PAIN: 0
CONFUSION: 1
BACK PAIN: 0

## 2020-12-07 ASSESSMENT — ACTIVITIES OF DAILY LIVING (ADL): ADLS_ACUITY_SCORE: 17
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
## 2020-12-07 NOTE — PRE-PROCEDURE
GENERAL PRE-PROCEDURE:   Procedure:  Pelvic angiogram   Date/Time:  12/7/2020 4:09 PM    Verbal consent obtained?: Yes    Written consent obtained?: Yes    Risks and benefits: Risks, benefits and alternatives were discussed    Consent given by:  Patient  Patient states understanding of procedure being performed: Yes    Patient's understanding of procedure matches consent: Yes    Procedure consent matches procedure scheduled: Yes    Expected level of sedation:  Moderate  Appropriately NPO:  Not NPO, but emergent condition outweighs risk  ASA Class:  Class 2- mild systemic disease, no acute problems, no functional limitations  Mallampati  :  Grade 2- soft palate, base of uvula, tonsillar pillars, and portion of posterior pharyngeal wall visible  Lungs:  Lungs clear with good breath sounds bilaterally  Heart:  Normal heart sounds and rate  History & Physical reviewed:  History and physical reviewed and no updates needed  Statement of review:  I have reviewed the lab findings, diagnostic data, medications, and the plan for sedation

## 2020-12-07 NOTE — H&P
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   Component Value Date    POTASSIUM 3.8 12/07/2020        LEWIS Coats CNP  Interventional Radiology  Pager: [phone]         Studies:  CT Lumbar Spine w Contrast   Final Result   Abnormal   Impression:    1. Acute compression fractures of the T12, L1, L2, and L3 superior   endplates without significant vertebral height loss.   2. Acute comminuted fracture within the right side of the sacrum with   extension to the right sacroiliac joint and right S2 sacral foramen   and includes right S1 superior articular facet.      [Critical Result: Acute compression fractures of the T12, L1, L2, and   L3 superior endplates without significant vertebral height loss and   acute comminuted fracture within the right side of the sacrum with   extension to the right sacroiliac joint and right S2 sacral foramen.       Finding was identified on 12/7/2020 2:44 PM.       Dr. Andre from ED was contacted by Dr. Brasher at 12/7/2020 2:46 PM   and verbalized understanding of the critical finding.        I have personally reviewed the examination and initial interpretation   and I agree with the findings.      TARIK IYER MD      CT Thoracic Spine w Contrast   Final Result   Abnormal "   Impression:    1. Acute compression fractures of the T12, L1, L2, and L3 superior   endplates without significant vertebral height loss.   2. Acute comminuted fracture within the right side of the sacrum with   extension to the right sacroiliac joint and right S2 sacral foramen   and includes right S1 superior articular facet.      [Critical Result: Acute compression fractures of the T12, L1, L2, and   L3 superior endplates without significant vertebral height loss and   acute comminuted fracture within the right side of the sacrum with   extension to the right sacroiliac joint and right S2 sacral foramen.       Finding was identified on 12/7/2020 2:44 PM.       Dr. Andre from ED was contacted by Dr. Brasher at 12/7/2020 2:46 PM   and verbalized understanding of the critical finding.        I have personally reviewed the examination and initial interpretation   and I agree with the findings.      TARIK IYER MD      CT Chest/Abdomen/Pelvis w Contrast   Preliminary Result   Abnormal   IMPRESSION:    1.  Multiple acute pelvic fractures:   A.  Acute comminuted fracture involving the right sacral ala, at least   levels 1-3 extending to sacroiliac joint, with small fracture fragment   abutting the exiting nerve root of S2 on the right. Mild asymmetric   widening of the right sacroiliac joint.   B.  Acute mildly displaced fractures involving the right superior and   inferior pubic ramus.       2.  Evidence of extravasation, potentially branching arterial vessel,   in the right hemipelvis that increases in size on delayed images.    Extraperitoneal hemorrhage in the pelvis and lower abdomen with   displacement of the bladder to the left of midline.  No CT evidence of   bladder rupture.   The left distal ureter is visualized on delayed images.  The right   ureter is not visualized.      3.  Acute fractures involving T12, L1, L2, and L3 superior endplates   without significant vertebral height loss.      4.  No CT  evidence of trauma in the chest or elsewhere in the abdomen.      [Urgent Result: Acute pelvic fractures, extravasation of contrast]      Finding was identified on 12/7/2020 2:35 PM.       Dr. Mabry was contacted by Dr. Johnston at 12/7/2020 3:20 PM and   verbalized understanding of the urgent finding.   Images also reviewed   with IR staff, Dr. Cathie Man.                     CT Cervical Spine w/o Contrast   Final Result   Impression: No acute fracture or traumatic subluxation.      I have personally reviewed the examination and initial interpretation   and I agree with the findings.      GUICHO REBOLLEDO MD      CT Head w/o Contrast   Final Result   Impression:   1. No acute intracranial pathology.    2. Partially visualized layering throughout the fluid within the   bilateral maxillary sinuses, may represent acute sinusitis in the   appropriate clinical setting.       I have personally reviewed the examination and initial interpretation   and I agree with the findings.      TARIK YIER MD      XR Pelvis Port 1/2 Views   Final Result   IMPRESSION: Partially visualized right hemipelvis fractures including   mildly displaced fracture at the root of the right superior pubic   ramus and suspected fracture of the right pubic body-inferior pubic   ramus. Possible widening of the pubic symphysis. CT chest abdomen   pelvis pending.      DES (Nalini BENITO MD      XR Chest Port 1 View   Final Result   IMPRESSION: No acute radiographic abnormality      I have personally reviewed the examination and initial interpretation   and I agree with the findings.      TOBI MACKAY, DO      XR [place]/Outlet Views    (Results Pending)   IR Pelvic Arterial Embolization    (Results Pending)         Rebecca Medina PA-C  Primary team: Trauma Services   Job code pager [phone] (24 hours a day)  Use Airphrame to text page (not text page compatible with [email or web address]).    Dial * * * [phone] then  [phone], wait for prompt and then enter  call back number.   Do NOT call numbers listed to right in Treatment Team section.

## 2020-12-07 NOTE — PROGRESS NOTES
Patient Name: Nimco Eaton  Medical Record Number: [id number]  Today's Date: 12/7/2020    Procedure: Pelvic bleed angiogram with embolization.  Proceduralist: MD Rylee., MD Lisette.    Procedure Start: 1635  Procedure end: 1910  Sedation medications administered: Fentanyl:150 mcg  Versed: 3mg    Nitro: 50 mcg @ 1715, 50mcg  @ 1718 50 mcg @1723 100 mcg @1739    Report given to: Nataliia CHAIDEZ 6B  : n/a    Other Notes: Pt arrived to IR room 4 from ED19. Consent reviewed. Pt denies any questions or concerns regarding procedure. Pt positioned supine and monitored per protocol. Pt tolerated procedure without any noted complications. Pt transferred to 6B    Mynx closure device deployed to left groin at 1859. Bedrest x3 hours until 2200.
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
## 2020-12-07 NOTE — ED NOTES
When packing up pt's belongings, pt's phone is not at the bedside. Pt did have iPhone, should be with her in IR.
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
## 2020-12-08 ENCOUNTER — APPOINTMENT (OUTPATIENT)
Dept: PHYSICAL THERAPY | Facility: CLINIC | Age: 20
DRG: 982 | End: 2020-12-08
Attending: PHYSICIAN ASSISTANT
Payer: COMMERCIAL

## 2020-12-08 ENCOUNTER — APPOINTMENT (OUTPATIENT)
Dept: GENERAL RADIOLOGY | Facility: CLINIC | Age: 20
DRG: 982 | End: 2020-12-08
Attending: PHYSICIAN ASSISTANT
Payer: COMMERCIAL

## 2020-12-08 LAB
ANION GAP SERPL CALCULATED.3IONS-SCNC: 8 MMOL/L (ref 3–14)
BUN SERPL-MCNC: 7 MG/DL (ref 7–30)
CALCIUM SERPL-MCNC: 8.3 MG/DL (ref 8.5–10.1)
CHLORIDE SERPL-SCNC: 106 MMOL/L (ref 94–109)
CO2 SERPL-SCNC: 22 MMOL/L (ref 20–32)
CREAT SERPL-MCNC: 0.76 MG/DL (ref 0.52–1.04)
ERYTHROCYTE [DISTWIDTH] IN BLOOD BY AUTOMATED COUNT: 12.1 % (ref 10–15)
ERYTHROCYTE [DISTWIDTH] IN BLOOD BY AUTOMATED COUNT: 12.1 % (ref 10–15)
GFR SERPL CREATININE-BSD FRML MDRD: >90 ML/MIN/{1.73_M2}
GLUCOSE SERPL-MCNC: 95 MG/DL (ref 70–99)
HCT VFR BLD AUTO: 26.6 % (ref 35–47)
HCT VFR BLD AUTO: 27.5 % (ref 35–47)
HGB BLD-MCNC: 8.8 G/DL (ref 11.7–15.7)
HGB BLD-MCNC: 9.1 G/DL (ref 11.7–15.7)
MAGNESIUM SERPL-MCNC: 1.6 MG/DL (ref 1.6–2.3)
MCH RBC QN AUTO: 30.3 PG (ref 26.5–33)
MCH RBC QN AUTO: 30.9 PG (ref 26.5–33)
MCHC RBC AUTO-ENTMCNC: 33.1 G/DL (ref 31.5–36.5)
MCHC RBC AUTO-ENTMCNC: 33.1 G/DL (ref 31.5–36.5)
MCV RBC AUTO: 92 FL (ref 78–100)
MCV RBC AUTO: 93 FL (ref 78–100)
PLATELET # BLD AUTO: 156 10E9/L (ref 150–450)
PLATELET # BLD AUTO: 170 10E9/L (ref 150–450)
POTASSIUM SERPL-SCNC: 3.8 MMOL/L (ref 3.4–5.3)
RBC # BLD AUTO: 2.85 10E12/L (ref 3.8–5.2)
RBC # BLD AUTO: 3 10E12/L (ref 3.8–5.2)
SODIUM SERPL-SCNC: 137 MMOL/L (ref 133–144)
WBC # BLD AUTO: 7.1 10E9/L (ref 4–11)
WBC # BLD AUTO: 7.1 10E9/L (ref 4–11)

## 2020-12-08 PROCEDURE — 80048 BASIC METABOLIC PNL TOTAL CA: CPT | Performed by: PHYSICIAN ASSISTANT

## 2020-12-08 PROCEDURE — 83735 ASSAY OF MAGNESIUM: CPT | Performed by: SURGERY

## 2020-12-08 PROCEDURE — 72080 X-RAY EXAM THORACOLMB 2/> VW: CPT

## 2020-12-08 PROCEDURE — 120N000003 HC R&B IMCU UMMC

## 2020-12-08 PROCEDURE — 258N000003 HC RX IP 258 OP 636: Performed by: STUDENT IN AN ORGANIZED HEALTH CARE EDUCATION/TRAINING PROGRAM

## 2020-12-08 PROCEDURE — 04LE3DZ OCCLUSION OF RIGHT INTERNAL ILIAC ARTERY WITH INTRALUMINAL DEVICE, PERCUTANEOUS APPROACH: ICD-10-PCS | Performed by: RADIOLOGY

## 2020-12-08 PROCEDURE — 85027 COMPLETE CBC AUTOMATED: CPT | Performed by: PHYSICIAN ASSISTANT

## 2020-12-08 PROCEDURE — 97530 THERAPEUTIC ACTIVITIES: CPT | Mod: GP

## 2020-12-08 PROCEDURE — 250N000013 HC RX MED GY IP 250 OP 250 PS 637: Performed by: STUDENT IN AN ORGANIZED HEALTH CARE EDUCATION/TRAINING PROGRAM

## 2020-12-08 PROCEDURE — 250N000013 HC RX MED GY IP 250 OP 250 PS 637: Performed by: SURGERY

## 2020-12-08 PROCEDURE — 97161 PT EVAL LOW COMPLEX 20 MIN: CPT | Mod: GP

## 2020-12-08 PROCEDURE — 99232 SBSQ HOSP IP/OBS MODERATE 35: CPT | Mod: GC | Performed by: NEUROLOGICAL SURGERY

## 2020-12-08 PROCEDURE — 36415 COLL VENOUS BLD VENIPUNCTURE: CPT | Performed by: SURGERY

## 2020-12-08 PROCEDURE — 99232 SBSQ HOSP IP/OBS MODERATE 35: CPT | Performed by: PHYSICIAN ASSISTANT

## 2020-12-08 PROCEDURE — L0472 TLSO RIGID FRAME HYPEREX PRE: HCPCS

## 2020-12-08 PROCEDURE — 36415 COLL VENOUS BLD VENIPUNCTURE: CPT | Performed by: PHYSICIAN ASSISTANT

## 2020-12-08 PROCEDURE — 72080 X-RAY EXAM THORACOLMB 2/> VW: CPT | Mod: 26 | Performed by: RADIOLOGY

## 2020-12-08 PROCEDURE — 250N000013 HC RX MED GY IP 250 OP 250 PS 637: Performed by: PHYSICIAN ASSISTANT

## 2020-12-08 PROCEDURE — 85027 COMPLETE CBC AUTOMATED: CPT | Performed by: SURGERY

## 2020-12-08 PROCEDURE — 250N000011 HC RX IP 250 OP 636: Performed by: SURGERY

## 2020-12-08 RX ORDER — TRETINOIN 0.25 MG/G
CREAM TOPICAL
COMMUNITY

## 2020-12-08 RX ORDER — MAGNESIUM SULFATE HEPTAHYDRATE 40 MG/ML
2 INJECTION, SOLUTION INTRAVENOUS ONCE
Status: COMPLETED | OUTPATIENT
Start: 2020-12-08 | End: 2020-12-08

## 2020-12-08 RX ORDER — MAGNESIUM OXIDE 400 MG/1
400 TABLET ORAL 2 TIMES DAILY
Status: DISCONTINUED | OUTPATIENT
Start: 2020-12-08 | End: 2020-12-09 | Stop reason: HOSPADM

## 2020-12-08 RX ADMIN — Medication 400 MG: at 19:49

## 2020-12-08 RX ADMIN — Medication 400 MG: at 10:36

## 2020-12-08 RX ADMIN — DOCUSATE SODIUM 100 MG: 100 CAPSULE, LIQUID FILLED ORAL at 07:49

## 2020-12-08 RX ADMIN — ACETAMINOPHEN 975 MG: 325 TABLET, FILM COATED ORAL at 07:49

## 2020-12-08 RX ADMIN — MAGNESIUM SULFATE IN WATER 2 G: 40 INJECTION, SOLUTION INTRAVENOUS at 01:25

## 2020-12-08 RX ADMIN — ACETAMINOPHEN 975 MG: 325 TABLET, FILM COATED ORAL at 19:49

## 2020-12-08 RX ADMIN — LIDOCAINE 3 PATCH: 560 PATCH PERCUTANEOUS; TOPICAL; TRANSDERMAL at 16:01

## 2020-12-08 RX ADMIN — SODIUM CHLORIDE: 9 INJECTION, SOLUTION INTRAVENOUS at 06:57

## 2020-12-08 RX ADMIN — DOCUSATE SODIUM 100 MG: 100 CAPSULE, LIQUID FILLED ORAL at 19:49

## 2020-12-08 RX ADMIN — ACETAMINOPHEN 975 MG: 325 TABLET, FILM COATED ORAL at 14:07

## 2020-12-08 ASSESSMENT — ACTIVITIES OF DAILY LIVING (ADL)
ADLS_ACUITY_SCORE: 15
ADLS_ACUITY_SCORE: 15
ADLS_ACUITY_SCORE: 17
ADLS_ACUITY_SCORE: 15

## 2020-12-08 ASSESSMENT — MIFFLIN-ST. JEOR: SCORE: 1453.54
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
## 2020-12-08 NOTE — PROGRESS NOTES
S: Patient seen today at [facility] for an eval for a Clements Hyperextension brace as ordered.    O/G: reduce spinal flexion    A: The patient was fit with a thoracolumbosacral anterior hyperextension orthosis Patito size small.  Multiple adjustments made to customize the fit of the brace on the patient. The pt was instructed on donning/doffing and proper care of the orthisis was explained.  The fitting of the orthosis was fit per manufactures recommendations.  Upon completion of initial fitting, pt had no complaints and the fitting appears to be satisfactory at this time.    P: I have instructed pt/staff to contact our facility with any questions and/or concerns.   ANDI Herndon.

## 2020-12-08 NOTE — PLAN OF CARE
Discharge Planner OT   Patient plan for discharge: Defer to PT.   Current status: Pt is mobilizing well but limited by low BPs. Per interdisciplinary collaboration, pt will have 24/7 assist with ADL/IADL at home. Cognitive concerns have resolved, but if they return, OT can reinitiate. At this time, PT is following for mobility, strength, and transfers and OT will sign off.   Barriers to return to prior living situation: Medical status.   Recommendations for discharge: Defer to PT  Rationale for recommendations: Defer to PT       Entered by: Carmita Ramírez 12/08/2020 4:05 PM     OT 6B Defer.
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
## 2020-12-08 NOTE — PHARMACY-ADMISSION MEDICATION HISTORY
Admission medication history interview status for the 12/7/2020 admission is complete. See Epic admission navigator for allergy information, pharmacy, prior to admission medications and immunization status.     Medication history interview sources:  - Patient interview  - SureScripts dispense report  - Care Everywhere summary note    Changes made to PTA medication list (reason)  Added:   Patient reported:  - Vitamin C  - Vitamin D    Deleted:   As per patient, no longer taking:  - Levonorgestrel - Ethinyl Estradiol (Chateal) 0.15 - 30 mcg tablet  - Levonorgestrel - Ethinyl Estradiol (Seasonale) 0.15 - 0.03 mg tablet  - Oil of oregano      Changed:   Updated sig:  Tretinoin (Retin-A) 0.025% cream    Additional medication history information:  Patient was a good historian and able to verify medication use. The list is reflective of all other prescription and over-the-counter medications.  Note: Currently not on any birth control pills    Prior to Admission medications    Medication Sig Last Dose Taking? Auth Provider   Ascorbic Acid (VITAMIN C PO) Takes 1 tablet by mouth once daily  Yes    tretinoin (RETIN-A) 0.025 % external cream Use a pea-sized amount to the face every other night, increasing to nightly as tolerated.  Yes    VITAMIN D PO Takes 1 tablet by mouth once daily  Yes      Medication history completed by: Julissa Cross, PharmD Candidate.
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
## 2020-12-08 NOTE — PLAN OF CARE
Time: 9413-4335  Data: POD# 1 s/p Gelfoam and coil embolization of the distal R obturator artery   Neuro:  A&Ox4  Activity: Bedrest, post procedure IR placed bedrest x 3 hours until 10:00 pm. Neurosurgery team also recommended no surgery, but [place] brace and to be bedrest until brace placement. Pt current on bedrest.   Pain: denied no pain when at rest/no movement. Sever pain with movement, and pt doesn't want to move for any reason.   Cardiac: WNL, denied chest pain, stable BP.   Respiratory: RA, sating >95%, LS clear all lobes. No respiratory distress noted. Deep breathing and coughing encouraged.   GI/: no bm this shift. Active bowel sound. Burton cath patent and adequate urine output.   Diet: regular, poor appetite.   Skin: incision site to left grain CDI.   LDAs: PIV x 2, SL and infusing NS 75 ml/hr.   Labs/Imaging: Mg2+ 1.5 replaced and recheck in the morning at 0600.   New change this shift: pelvic xray done.   Plan: awaiting brace, pain management, keep her bedrest until brace placement.

## 2020-12-08 NOTE — PROGRESS NOTES
Orthopedic Update    Briefly, this patient has a lateral compression type pelvic ring injury with right pubic root and inferior pubic ramus fractures with associated comminuted right sacral ala fracture extending into zone 2 with a nondisplaced component extending into zone 3 in the S1 sacral body. Orthopedic staff, Dr. Ford discussed the patient's case with Dr. Alvarado at [facility] who specializes in pelvic trauma. He would like to discuss the pros and cons of surgery with the patient and her mother. Orthopedics recommendations are as follows:  - TTWB RLE with assistive device  - Physical therapy evaluation for mobilization  --- If patient is able to mobilize and obtains brace per NS, she may discharge and follow up as outpatient in Dr. Alvarado's clinic (patient information provided to Dr. Alvarado's team for referral)   --- If patient is unable to mobilize or is not safe for discharge per the primary service, recommend transfer to [facility] for possible OR Thursday with Dr. Alvarado.    Discussed the plan of care with the patient and her mother, Monik Ramos ([phone]). Both verbalized understanding and agreement with the plan of care as described above.     Please do not hesitate to reach out with questions or concerns.    Discussed with Dr. Ford, orthopedic staff, who agrees with the plan as described.    Addy Melvin MD  Orthopaedic Surgery, PGY4  [phone]

## 2020-12-08 NOTE — PROVIDER NOTIFICATION
Patient had been on bedrest post procedure x 3 hours until 2200 last night. Neurosurgery team also recommended to be bedrest until brace placed. This RN contacted to clarify if Trauma team saw Neurosurgery team note to bed bedrest recommendation. Sallie Ahumada MD called back and gave verbal order to keep patient bedrest until brace placement done. Patient also updated the order.

## 2020-12-08 NOTE — PROCEDURES
[facility]     Procedure: IR Procedure Note    Date/Time: 12/7/2020 7:21 PM  Performed by: Janet Knox MD  Authorized by: Janet Knox MD   IR Fellow Physician: JANA Knox MD.   Other(s) attending procedure: SANDRA Mckee MD.     UNIVERSAL PROTOCOL   Site Marked: Yes  Prior Images Obtained and Reviewed:  Yes  Required items: Required blood products, implants, devices and special equipment available    Patient identity confirmed:  Verbally with patient, arm band, provided demographic data and hospital-assigned identification number  Patient was reevaluated immediately before administering moderate or deep sedation or anesthesia  Confirmation Checklist:  Patient's identity using two indicators, relevant allergies, procedure was appropriate and matched the consent or emergent situation and correct equipment/implants were available  Time out: Immediately prior to the procedure a time out was called    Universal Protocol: the Joint Commission Universal Protocol was followed    Preparation: Patient was prepped and draped in usual sterile fashion           ANESTHESIA    Anesthesia: Local infiltration  Local Anesthetic:  Lidocaine 1% without epinephrine      SEDATION    Patient Sedated: Yes    Sedation Type:  Moderate (conscious) sedation  Vital signs: Vital signs monitored during sedation    See dictated procedure note for full details.  Findings: -Right obturator arterial extravasation.     Specimens: none    Complications: None    Condition: Stable    Plan: -Bedrest x 3 hours.   -Resume inpatient care.       PROCEDURE   Patient Tolerance:  Patient tolerated the procedure well with no immediate complications  Describe Procedure: -Gelfoam and coil embolization of the distal right obturator artery.   Length of time physician/provider present for 1:1 monitoring during sedation: 150
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
## 2020-12-08 NOTE — PLAN OF CARE
"/70 (BP Location: Right arm)   Pulse 72   Temp 98.2  F (36.8  C) (Oral)   Resp 18   Ht 1.727 m (5' 8\")   Wt 63.5 kg (140 lb)   LMP  (LMP Unknown)   SpO2 99%   BMI 21.29 kg/m      Neuro: A&Ox4. PERRLA. No numbness or tingling. CN intact.    Cardiac: SR. VSS blood pressure runs soft. NO TELE.   Respiratory:RA.  GI/: Adequate urine output via narayanan. No BM, active bowel sounds.   Diet/appetite: Tolerating regular diet. Eating well.  Activity:  Bedrest until ortho brace in place and PT eval. .  Pain: At acceptable level on current regimen. Only needing tylenol at this time. Willing to use lidocaine patches.   Skin: No new deficits noted.  LDA's: Bilat PIV saline locked.     Plan: discharge in am on 12/9 and then outpatient surgery with [facility]. Continue with POC. Notify primary team with changes.    "

## 2020-12-09 ENCOUNTER — PATIENT OUTREACH (OUTPATIENT)
Dept: CARE COORDINATION | Facility: CLINIC | Age: 20
End: 2020-12-09

## 2020-12-09 VITALS
HEART RATE: 79 BPM | DIASTOLIC BLOOD PRESSURE: 64 MMHG | WEIGHT: 140 LBS | TEMPERATURE: 98.8 F | RESPIRATION RATE: 16 BRPM | OXYGEN SATURATION: 98 % | HEIGHT: 68 IN | SYSTOLIC BLOOD PRESSURE: 114 MMHG | BODY MASS INDEX: 21.22 KG/M2

## 2020-12-09 LAB — HGB BLD-MCNC: 8.2 G/DL (ref 11.7–15.7)

## 2020-12-09 PROCEDURE — 250N000013 HC RX MED GY IP 250 OP 250 PS 637: Performed by: SURGERY

## 2020-12-09 PROCEDURE — 99231 SBSQ HOSP IP/OBS SF/LOW 25: CPT | Mod: GC | Performed by: NEUROLOGICAL SURGERY

## 2020-12-09 PROCEDURE — 36415 COLL VENOUS BLD VENIPUNCTURE: CPT | Performed by: SURGERY

## 2020-12-09 PROCEDURE — 250N000013 HC RX MED GY IP 250 OP 250 PS 637: Performed by: PHYSICIAN ASSISTANT

## 2020-12-09 PROCEDURE — 36415 COLL VENOUS BLD VENIPUNCTURE: CPT | Performed by: PHYSICIAN ASSISTANT

## 2020-12-09 PROCEDURE — 99239 HOSP IP/OBS DSCHRG MGMT >30: CPT | Performed by: PHYSICIAN ASSISTANT

## 2020-12-09 PROCEDURE — 250N000013 HC RX MED GY IP 250 OP 250 PS 637: Performed by: STUDENT IN AN ORGANIZED HEALTH CARE EDUCATION/TRAINING PROGRAM

## 2020-12-09 PROCEDURE — 85018 HEMOGLOBIN: CPT | Performed by: PHYSICIAN ASSISTANT

## 2020-12-09 RX ORDER — ACETAMINOPHEN 325 MG/1
975 TABLET ORAL 3 TIMES DAILY
Qty: 63 TABLET | Refills: 0 | Status: SHIPPED | OUTPATIENT
Start: 2020-12-09

## 2020-12-09 RX ORDER — OXYCODONE HYDROCHLORIDE 5 MG/1
2.5 TABLET ORAL EVERY 6 HOURS PRN
Qty: 3 TABLET | Refills: 0 | Status: SHIPPED | OUTPATIENT
Start: 2020-12-09

## 2020-12-09 RX ORDER — LIDOCAINE 4 G/G
1-3 PATCH TOPICAL EVERY 24 HOURS
Qty: 15 PATCH | Refills: 3 | Status: SHIPPED | OUTPATIENT
Start: 2020-12-09

## 2020-12-09 RX ADMIN — ACETAMINOPHEN 975 MG: 325 TABLET, FILM COATED ORAL at 13:02

## 2020-12-09 RX ADMIN — ACETAMINOPHEN 975 MG: 325 TABLET, FILM COATED ORAL at 08:00

## 2020-12-09 RX ADMIN — Medication 400 MG: at 08:00

## 2020-12-09 RX ADMIN — DOCUSATE SODIUM 100 MG: 100 CAPSULE, LIQUID FILLED ORAL at 08:00

## 2020-12-09 ASSESSMENT — ACTIVITIES OF DAILY LIVING (ADL)
ADLS_ACUITY_SCORE: 15

## 2020-12-09 NOTE — PLAN OF CARE
Physical Therapy Discharge Summary    Reason for therapy discharge:    Discharged to [facility]     Progress towards therapy goal(s). See goals on Care Plan in [facility] electronic health record for goal details.  Goals not met.  Barriers to achieving goals:   discharge from facility.    Therapy recommendation(s):    Continued therapy is recommended.  Rationale/Recommendations:  to progress IND with functional mobility .
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
## 2020-12-09 NOTE — PLAN OF CARE
Neuro: A&Ox4.   Cardiac: No tele. HR 70-80s. VSS.   Respiratory: Sating >95% on RA.   GI/: Adequate urine output. Burton out, voided with no PVR. BM X1  Diet/appetite: Tolerating regular diet.   Activity:  Assist x1 with walker. R toe touch weight bearing. Brace when out of bed. Ambulated to bathroom.   Pain: At acceptable level on current regimen.   Skin: No new deficits noted. Groin site WDL.   LDA's: PIV x1    Plan: Plan to discharge this am. Continue with POC. Notify primary team with changes.

## 2020-12-09 NOTE — DISCHARGE SUMMARY
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 (1.5- 3.0 mm MVP-3 device) was deployed  in the distal right obturator artery. Post deployment angiogram  demonstrated cessation of supply to the distal pudendal artery.     The catheter wire were removed. Angiogram of the left common femoral  arteriotomy was unremarkable. A 5 Persian Mynx closure device was  advanced through the 5 Persian sheath and employed successfully with  hemostasis achieved. The site was cleansed and dressed with sterile  dressing.    FINDINGS: Active extravasation originating from the right obturator  artery adjacent to the right pubic rami fracture.      Impression    IMPRESSION:  Gelfoam and vascular plug embolization of the active extravasation  from the distal right obturator artery.     Plan:  1. Bed rest with right leg straight x 3 hours.   2. Resume inpatient care. Trend Hgb.      XR Thoracic Lumbar Standing 2 Views    Narrative    EXAM: XR THORACIC LUMBAR STANDING 2 VW  LOCATION: [facility]  DATE/TIME: 12/8/2020 7:02 PM    INDICATION: Spine fractures.  COMPARISON: Thoracic and lumbar spine CT 12/07/2020      Impression    IMPRESSION:  5 lumbar type vertebrae. Upright radiographs. Patient is wearing a brace. Mild dextrocurvature of  the lumbar spine. Normal sagittal alignment. No significant change in the mild compression fractures from T12 to L3. The patient's known fracture involving   the right S1 and S2 extending into the right sacroiliac joint are better visualized on the prior lumbar spine CT. No pars defects.               Time Spent on this Encounter   I, SHAN Moore, personally saw the patient today and spent greater than 30 minutes discharging this patient.    We appreciate the opportunity to care for your patient while in the hospital.  Should you have any questions about their injuries or this discharge summary our contact information is below.    Trauma Services  [facility]   Department of Critical Care and Acute Care Surgery  [address] [place] [id number]  Office: [phone]

## 2020-12-09 NOTE — PROGRESS NOTES
"Transfer:  Transferred to: [facility] ortho floor.   Via: stretcher   Reason for transfer:patient going to get consult for ortho surgery  Family: Aware of transfer  Belongings: Packed and sent with pt  Report given to: [facility] nurse on floor 11/ortho.  Pt status: stable.     /64 (BP Location: Right arm)   Pulse 79   Temp 98.8  F (37.1  C) (Oral)   Resp 16   Ht 1.727 m (5' 8\")   Wt 63.5 kg (140 lb)   LMP  (LMP Unknown)   SpO2 98%   BMI 21.29 kg/m      Alert and oriented. Vitals stable. No SOB. Denies pain at this time. SBA with R only toe touching with walker and brace on. Regular diet, fair appetite. Voiding and stooling appropriately.   " MHYX N LIMA CHIRO    20  Jessica Morales : 2004 Sex: female  Age: 12 y.o. Patient was referred by Roque Thayer MD    Chief Complaint   Patient presents with    Back Pain     sharp pains in left side        Back Pain   This is a new problem. The current episode started yesterday. The problem occurs constantly. The problem has been waxing and waning since onset. The pain is present in the thoracic spine. The quality of the pain is described as stabbing (sharp). The pain is at a severity of 7/10. The symptoms are aggravated by bending and twisting (walking). Stiffness is present in the morning. Associated symptoms include chest pain and headaches. Pertinent negatives include no bladder incontinence, bowel incontinence, fever, leg pain, paresthesias, tingling or weakness. The middle back pain will wrap around into the anterior chest wall. Also having some pain identified in the bilateral suprascapular regions were right worse than left. Prior chiropractic care noted consisting solely of manipulation. Red Flags:  none    Review of Systems   Constitutional: Negative for fever. Respiratory: Negative for apnea, chest tightness and shortness of breath. Cardiovascular: Positive for chest pain. Gastrointestinal: Negative for bowel incontinence. Genitourinary: Negative for bladder incontinence. Musculoskeletal: Positive for arthralgias and back pain. Neurological: Positive for headaches. Negative for tingling, weakness and paresthesias. Current Outpatient Medications:     drospirenone-ethinyl estradiol (JEMAL) 3-0.02 MG per tablet, Take 1 tablet PO daily. Do not take the placebo pills.   Refill ever 21 days, Disp: 21 tablet, Rfl: 13    sertraline (ZOLOFT) 50 MG tablet, Take 1.5 tablets by mouth daily, Disp: 135 tablet, Rfl: 1    VELIVET 0.1/0.125/0.15 -0.025 MG tablet, take 1 tablet by mouth once daily (Patient not taking: Reported on 2020), Disp: 28 tablet, Rfl: 11   famotidine (PEPCID) 20 MG tablet, Take 20 mg by mouth 2 times daily, Disp: , Rfl:     loratadine (CLARITIN) 10 MG tablet, Take 10 mg by mouth daily, Disp: , Rfl:     Allergies   Allergen Reactions    Neomycin Rash       No past medical history on file. Family History   Problem Relation Age of Onset    No Known Problems Brother     No Known Problems Brother      No past surgical history on file.   Social History     Socioeconomic History    Marital status: Single     Spouse name: Not on file    Number of children: Not on file    Years of education: Not on file    Highest education level: Not on file   Occupational History    Not on file   Social Needs    Financial resource strain: Not hard at all    Food insecurity     Worry: Never true     Inability: Never true   [employer] needs     Medical: Not on file     Non-medical: Not on file   Tobacco Use    Smoking status: Never Smoker    Smokeless tobacco: Never Used   Substance and Sexual Activity    Alcohol use: No    Drug use: No    Sexual activity: Not on file   Lifestyle    Physical activity     Days per week: Not on file     Minutes per session: Not on file    Stress: Not on file   Relationships    Social connections     Talks on phone: Not on file     Gets together: Not on file     Attends [employer] service: Not on file     Active member of club or organization: Not on file     Attends meetings of clubs or organizations: Not on file     Relationship status: Not on file    Intimate partner violence     Fear of current or ex partner: Not on file     Emotionally abused: Not on file     Physically abused: Not on file     Forced sexual activity: Not on file   Other Topics Concern    Not on file   Social History Narrative    Not on file       Vitals:    07/22/20 1331   BP: 100/60   Site: Left Upper Arm   Pulse: 71   Temp: 97.9 °F (36.6 °C)   TempSrc: Temporal   SpO2: 97%   Weight: 117 lb (53.1 kg)   Height: 5' 4\" (1.626 m)       EXAM:  Appears well.  No apparent distress. Alert and oriented x3. Ambulates without difficulty. No antalgia or list.  There is a loss of thoracic extension and bilateral rotation with mild endrange pain reported. Mild midline discomfort with palpation T5-7. There is tenderness over the costotransverse joints on the left at these levels. Reflexes are +2 and symmetrical at the Patella and +1 Achilles. Manual muscle testing reveal 5/5 strength throughout the LE indicator muscles B/L. Sensation to light touch is WNL to the lower extremity dermatomes. Posterior Tibial and Dorsal Pedis pulses 2/4. SLR testing negative bilaterally. Kemps testing negative bilaterally. Resisted chest expansion and excursion is unremarkable. There is some intercostal tenderness on the left between ribs 6 and 7. There are hypertonic and tender fibers noted with palpation today throughout the thoracic region. Active trigger points noted in the bilateral trapezius, right worse than left. Joint fixation is noted with motion screening at T5-7. Genoveva Alvarez was seen today for back pain. Diagnoses and all orders for this visit:    Panniculitis affecting regions of neck and back, thoracic region    Intercostal neuralgia    Muscle spasm of back        Treatment Plan:   I spoke with her regarding my exam findings and treatment options. Multiple subluxations and myofascial pain. I recommended that we start a trial of conservative care today consisting manipulation, EMS and home-based therapeutic exercises. .  I will plan on seeing her 2 times per week for 3 weeks, then reassess to determine response to care and future options. With consent, I did begin today. Problem/Goals  Decrease pain and/or swelling and Increase ROM and/or flexibility    Today's Treatment  Started therapeutic exercises for home. Specifically took her through-camel x10 and quadruped thoracic rotations x10 bilateral.  Time requirement not met.   EMS with heat to the interscapular region for 15 minutes to address muscle spasm/hypertension and alleviate pain. Diversified manipulation as per clinical findings. I spoke to her regarding posttreatment soreness. Should any arise, she  may use ice for 10-15 minutes, over-the-counter NSAIDs or topical gels. Seen By:  Valerie Kiser DC    * This note was created using voice recognition software.   The note was reviewed, however grammatical errors may exist.
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
## 2020-12-10 NOTE — TELEPHONE ENCOUNTER
SPINE PATIENTS - NEW PROTOCOL PREVISIT    RECORDS RECEIVED FROM: Internal   Date of Appt: 3/2/21   NOTES (FOR ALL VISITS) STATUS DETAILS   OFFICE NOTE from referring provider Internal SEE INPATIENT NOTES   OFFICE NOTE from other specialist N/A    DISCHARGE SUMMARY from hospital Internal [facility]:  12/7/20-12/9/20   DISCHARGE REPORT from ER N/A    EMG REPORT N/A    MEDICATION LIST Internal    IMAGING  (FOR ALL VISITS)     MRI (HEAD, NECK, SPINE) N/A    XRAY (SPINE) *NEUROSURGERY* Internal [facility]:  XR Spine 3/1/21    [facility]:  XR Thoracic Lumbar Spine 12/8/20   CT (HEAD, NECK, SPINE) Internal [facility]:  CT Lumbar Spine 12/7/20  CT Thoracic Spine 12/7/20  CT Cervical Spine 12/7/20  CT Head 12/7/20      Action 12/10/20 MV 11.19am   Action Taken Patient to have updated XR close to appt     Phone Call:  2/24/21 MV 9.42am   Contact Name Nimco Eaton   Outcome Called and spoke with patient regarding updated XRs. Patient knew about the need for updated imaging and wants to get them done here. Patient was confused and thought it was taken care of. I informed the patient that perhaps there was some miscommunication because the imaging appt was not scheduled yet. I assured patient that someone from scheduling will contact her to make the imaging appts prior to seeing Macrina. Patient verbalized understanding.

## 2020-12-10 NOTE — PROGRESS NOTES
Patient was transferred inpatient to [facility] for ongoing care. No post-hospital call at this time.    Pt has been accepted for transfer to [facility] #[phone], under the Care of Dr Alvarado.  I have spoke to Patient Placement at [facility], they have an available bed on [address], Room [address]1107  #[phone].  Transport arranged through [facility] [employer] today @ 2:30pm. Pt agreeable to above plan and voices understanding.    Rayna Quiroz CMA, [facility]  Post Hospital Discharge Team

## 2021-01-04 ENCOUNTER — HEALTH MAINTENANCE LETTER (OUTPATIENT)
Age: 21
End: 2021-01-04

## 2021-01-05 ENCOUNTER — DOCUMENTATION ONLY (OUTPATIENT)
Dept: NEUROSURGERY | Facility: CLINIC | Age: 21
End: 2021-01-05

## 2021-01-05 NOTE — PROGRESS NOTES
Patient may be seen 1/25- 1/27.   Will need xray.   She will require the brace for a total of 3 months.   Patient can see orthotics if brace is not fitting well, and for assessment if she needs brace adjustment or to be fitted for a new brace.     Macrina Mora DNP  Neurosurgery Nurse Practitioner  [facility]  [phone]

## 2021-02-24 ENCOUNTER — TELEPHONE (OUTPATIENT)
Dept: NEUROSURGERY | Facility: CLINIC | Age: 21
End: 2021-02-24

## 2021-03-01 ENCOUNTER — ANCILLARY PROCEDURE (OUTPATIENT)
Dept: GENERAL RADIOLOGY | Facility: CLINIC | Age: 21
End: 2021-03-01
Attending: NURSE PRACTITIONER
Payer: COMMERCIAL

## 2021-03-01 DIAGNOSIS — S22.000A COMPRESSION FRACTURE OF THORACIC VERTEBRA, CLOSED, INITIAL ENCOUNTER (H): ICD-10-CM

## 2021-03-01 PROCEDURE — 72080 X-RAY EXAM THORACOLMB 2/> VW: CPT | Performed by: STUDENT IN AN ORGANIZED HEALTH CARE EDUCATION/TRAINING PROGRAM

## 2021-03-02 ENCOUNTER — PRE VISIT (OUTPATIENT)
Dept: NEUROSURGERY | Facility: CLINIC | Age: 21
End: 2021-03-02

## 2021-03-02 ENCOUNTER — VIRTUAL VISIT (OUTPATIENT)
Dept: NEUROSURGERY | Facility: CLINIC | Age: 21
End: 2021-03-02
Payer: COMMERCIAL

## 2021-03-02 DIAGNOSIS — S32.010S COMPRESSION FRACTURE OF L1 LUMBAR VERTEBRA, SEQUELA: ICD-10-CM

## 2021-03-02 DIAGNOSIS — S22.080D COMPRESSION FRACTURE OF T12 VERTEBRA WITH ROUTINE HEALING, SUBSEQUENT ENCOUNTER: ICD-10-CM

## 2021-03-02 DIAGNOSIS — T07.XXXA MULTIPLE TRAUMA: Primary | ICD-10-CM

## 2021-03-02 DIAGNOSIS — S32.020D COMPRESSION FRACTURE OF L2 VERTEBRA WITH ROUTINE HEALING, SUBSEQUENT ENCOUNTER: ICD-10-CM

## 2021-03-02 PROCEDURE — 99214 OFFICE O/P EST MOD 30 MIN: CPT | Mod: 95 | Performed by: NURSE PRACTITIONER

## 2021-03-02 NOTE — PATIENT INSTRUCTIONS
"~Okay to taper/wean out of spine brace  (Reviewed with patient)   ~Continue with physical therapy for back strengthening, stretching, and increased range of motion   ~We reviewed activity going forward and gradual lifting of restrictions.  ~Increase lifting, as tolerated, \"low and slow\" and with guidance of Physical therapy.   ~Continue to avoid \"significant\" twisting, bending of the spine  ~Call if develops new, or severe back pain, not relieved with rest or Tylenol, or any new radiating pain/numbess/tingling or weakness in leg(s)  ~Return to the Neurosurgery Clinic in 6 weeks once out or your brace, for final one final xray image to make sure fractures are stable.      At the end of the visit, all the patient's questions and concerns had been addressed and the patient was agreeable with the plan of care as outlined above. The patient has our office contact information at [phone], and knows to call with any questions, concerns, or changes in condition.     "
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
## 2021-03-22 ENCOUNTER — OFFICE VISIT (OUTPATIENT)
Dept: DERMATOLOGY | Facility: CLINIC | Age: 21
End: 2021-03-22
Payer: COMMERCIAL

## 2021-03-22 DIAGNOSIS — D22.9 MULTIPLE BENIGN NEVI: Primary | ICD-10-CM

## 2021-03-22 PROCEDURE — 99213 OFFICE O/P EST LOW 20 MIN: CPT | Performed by: PHYSICIAN ASSISTANT

## 2021-03-22 ASSESSMENT — PAIN SCALES - GENERAL: PAINLEVEL: NO PAIN (0)

## 2021-03-22 NOTE — NURSING NOTE
Dermatology Rooming Note    Nimco Eaton's goals for this visit include:   Chief Complaint   Patient presents with     Derm Problem     Nimco, is being seen today for a mole check left lateral leg, left foot toe, and head, as reported by patient.     Kyara Rasheed LPN
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
## 2021-03-22 NOTE — PATIENT INSTRUCTIONS
The ABCDEs of Melanoma    Skin cancer can develop anywhere on the skin. Ask someone for help when checking your skin, especially in hard to see places. If you notice a mole different from others, or that changes, enlarges, itches, or bleeds (even if it is small), you should see a dermatologist.              Sun protective clothing and Resources     Handle (www.[employer])  Athleta (www.OpenHomes)  WaveMaker Labs (www.Abakus)  Carve Designs ([employer]) - affordable  Skinz ([employer]kinz[employer])    Long sleeve - Jessica Cool DRI UPF 50 or [place] PFG UPF 50  Hoodie - [place] PFG UPF 50  Swimshirt/Rash Guard - Alexandra UPF 50 (on Amazon)  Neck - [employer] Ubertubes (www.outdoorresHatch.com)

## 2021-04-25 ENCOUNTER — HEALTH MAINTENANCE LETTER (OUTPATIENT)
Age: 21
End: 2021-04-25

## 2021-04-28 ENCOUNTER — TELEPHONE (OUTPATIENT)
Dept: NEUROSURGERY | Facility: CLINIC | Age: 21
End: 2021-04-28

## 2021-05-07 ENCOUNTER — TELEPHONE (OUTPATIENT)
Dept: NEUROSURGERY | Facility: CLINIC | Age: 21
End: 2021-05-07

## 2021-05-11 ENCOUNTER — TELEPHONE (OUTPATIENT)
Dept: NEUROSURGERY | Facility: CLINIC | Age: 21
End: 2021-05-11

## 2021-05-12 ENCOUNTER — TELEPHONE (OUTPATIENT)
Dept: NEUROSURGERY | Facility: CLINIC | Age: 21
End: 2021-05-12

## 2021-05-14 ENCOUNTER — ANCILLARY PROCEDURE (OUTPATIENT)
Dept: GENERAL RADIOLOGY | Facility: CLINIC | Age: 21
End: 2021-05-14
Attending: NURSE PRACTITIONER
Payer: COMMERCIAL

## 2021-05-14 DIAGNOSIS — T07.XXXA MULTIPLE TRAUMA: ICD-10-CM

## 2021-05-14 DIAGNOSIS — S32.020D COMPRESSION FRACTURE OF L2 VERTEBRA WITH ROUTINE HEALING, SUBSEQUENT ENCOUNTER: ICD-10-CM

## 2021-05-14 DIAGNOSIS — S22.080D COMPRESSION FRACTURE OF T12 VERTEBRA WITH ROUTINE HEALING, SUBSEQUENT ENCOUNTER: ICD-10-CM

## 2021-05-14 DIAGNOSIS — S32.010S COMPRESSION FRACTURE OF L1 LUMBAR VERTEBRA, SEQUELA: ICD-10-CM

## 2021-05-14 PROCEDURE — 72081 X-RAY EXAM ENTIRE SPI 1 VW: CPT | Performed by: STUDENT IN AN ORGANIZED HEALTH CARE EDUCATION/TRAINING PROGRAM

## 2021-05-18 ENCOUNTER — VIRTUAL VISIT (OUTPATIENT)
Dept: NEUROSURGERY | Facility: CLINIC | Age: 21
End: 2021-05-18
Payer: COMMERCIAL

## 2021-05-18 DIAGNOSIS — S22.080D COMPRESSION FRACTURE OF T12 VERTEBRA WITH ROUTINE HEALING, SUBSEQUENT ENCOUNTER: Primary | ICD-10-CM

## 2021-05-18 DIAGNOSIS — S32.010S COMPRESSION FRACTURE OF L1 LUMBAR VERTEBRA, SEQUELA: ICD-10-CM

## 2021-05-18 DIAGNOSIS — S32.020D COMPRESSION FRACTURE OF L2 VERTEBRA WITH ROUTINE HEALING, SUBSEQUENT ENCOUNTER: ICD-10-CM

## 2021-05-18 PROCEDURE — 99213 OFFICE O/P EST LOW 20 MIN: CPT | Mod: 95 | Performed by: NURSE PRACTITIONER
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
## 2021-05-24 NOTE — PATIENT INSTRUCTIONS
"~continue back strengthening, stretching, and increased range of motion   ~We reviewed her activity going forward and gradual lifting of restrictions.  ~Increase lifting, as tolerated, \"low and slow\"   ~Call if develops new, or severe back pain, not relieved with rest or Tylenol, or any new radiating pain/numbess/tingling or weakness in leg(s)  ~Unless the patient develops new pain or symptoms, no further imaging is required, and at this time, can be discharged from the Neurosurgery Clinic, and can return on an as-needed basis      At the end of the visit, all the patient's questions and concerns had been addressed and the patient was agreeable with the plan of care as outlined above. The patient has our office contact information at [phone], and knows to call with any questions, concerns, or changes in condition.     "

## 2021-10-10 ENCOUNTER — HEALTH MAINTENANCE LETTER (OUTPATIENT)
Age: 21
End: 2021-10-10

## 2022-01-19 ENCOUNTER — OFFICE VISIT (OUTPATIENT)
Dept: DERMATOLOGY | Facility: CLINIC | Age: 22
End: 2022-01-19
Payer: COMMERCIAL

## 2022-01-19 DIAGNOSIS — L91.8 SKIN TAG: ICD-10-CM

## 2022-01-19 DIAGNOSIS — D22.9 MULTIPLE BENIGN NEVI: Primary | ICD-10-CM

## 2022-01-19 PROCEDURE — 99213 OFFICE O/P EST LOW 20 MIN: CPT | Mod: 25 | Performed by: PHYSICIAN ASSISTANT

## 2022-01-19 PROCEDURE — 11200 RMVL SKIN TAGS UP TO&INC 15: CPT | Performed by: PHYSICIAN ASSISTANT
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
## 2022-01-19 NOTE — NURSING NOTE
Nimco Eaton's goals for this visit include:   Chief Complaint   Patient presents with     Skin Check     FBSE. Area of concern-skin tags, head, back, left leg. No personal or family hx of SC       She requests these members of her care team be copied on today's visit information:     PCP: No Ref-Primary, Physician    Referring Provider:  Referred Self, MD  No address on file    There were no vitals taken for this visit.    Do you need any medication refills at today's visit? Eboni Beckwith on 1/19/2022 at 4:12 PM

## 2022-01-19 NOTE — PATIENT INSTRUCTIONS
Cryotherapy    What is it?    Use of a very cold liquid, such as liquid nitrogen, to freeze and destroy abnormal skin cells that need to be removed    What should I expect?    Tenderness and redness    A small blister that might grow and fill with dark purple blood. There may be crusting.    More than one treatment may be needed if the lesions do not go away.    How do I care for the treated area?    Gently wash the area with your hands when bathing.    Use a thin layer of Vaseline to help with healing. You may use a Band-Aid.     The area should heal within 7-10 days and may leave behind a pink or lighter color.     Do not use an antibiotic or Neosporin ointment.     You may take acetaminophen (Tylenol) for pain.     Call your doctor if you have:    Severe pain    Signs of infection (warmth, redness, cloudy yellow drainage, and or a bad smell)    Questions or concerns    Who should I call with questions?       [facility]: [phone]       [facility]: [phone]       For urgent needs outside of business hours call the [facility] at [phone] and ask for the dermatology resident on call
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
## 2022-01-30 ENCOUNTER — HEALTH MAINTENANCE LETTER (OUTPATIENT)
Age: 22
End: 2022-01-30

## 2022-09-18 ENCOUNTER — HEALTH MAINTENANCE LETTER (OUTPATIENT)
Age: 22
End: 2022-09-18

## 2023-01-01 NOTE — PLAN OF CARE
Neuro: A&Ox4. Neuros intact. Pleasant, able to make needs known.  Cardiac: No tele ordered. HR 70-80s. VSS. Orthostatic BP 70/50s with PT, all other BP stable.  Respiratory: Sating >99% on RA. Denies SOB.  GI/: Adequate urine output. No BM. Burton pulled this shift.  Diet/appetite: Tolerating regular diet. Eating well.  Activity:  RLE toe touch WB. Assist of 1 with walker, up to chair and ambulated in room. Brace when out of bed.  Pain: At acceptable level on current regimen.   Skin: No new deficits noted.  LDA's:   -R PIV: removed  -L PIV: SL    Plan: Standing Xray completed for brace fitting. Plan for discharge in AM. Continue with POC. Notify primary team with changes.     9

## 2023-05-08 ENCOUNTER — HEALTH MAINTENANCE LETTER (OUTPATIENT)
Age: 23
End: 2023-05-08

## 2024-07-14 ENCOUNTER — HEALTH MAINTENANCE LETTER (OUTPATIENT)
Age: 24
End: 2024-07-14

## (undated) RX ORDER — FENTANYL CITRATE 50 UG/ML
INJECTION, SOLUTION INTRAMUSCULAR; INTRAVENOUS
Status: DISPENSED
Start: 2020-12-07

## (undated) RX ORDER — LIDOCAINE HYDROCHLORIDE 10 MG/ML
INJECTION, SOLUTION EPIDURAL; INFILTRATION; INTRACAUDAL; PERINEURAL
Status: DISPENSED
Start: 2020-12-07

## (undated) RX ORDER — NITROGLYCERIN 5 MG/ML
VIAL (ML) INTRAVENOUS
Status: DISPENSED
Start: 2020-12-07